# Patient Record
Sex: FEMALE | Race: WHITE | NOT HISPANIC OR LATINO | Employment: FULL TIME | URBAN - METROPOLITAN AREA
[De-identification: names, ages, dates, MRNs, and addresses within clinical notes are randomized per-mention and may not be internally consistent; named-entity substitution may affect disease eponyms.]

---

## 2018-02-01 ENCOUNTER — OFFICE VISIT (OUTPATIENT)
Dept: OBSTETRICS AND GYNECOLOGY | Facility: CLINIC | Age: 23
End: 2018-02-01
Payer: MEDICAID

## 2018-02-01 VITALS
SYSTOLIC BLOOD PRESSURE: 102 MMHG | DIASTOLIC BLOOD PRESSURE: 60 MMHG | WEIGHT: 127.88 LBS | BODY MASS INDEX: 25.83 KG/M2

## 2018-02-01 DIAGNOSIS — Z34.82 PRENATAL CARE, SUBSEQUENT PREGNANCY IN SECOND TRIMESTER: Primary | ICD-10-CM

## 2018-02-01 PROCEDURE — 99213 OFFICE O/P EST LOW 20 MIN: CPT | Mod: S$PBB,TH,, | Performed by: OBSTETRICS & GYNECOLOGY

## 2018-02-01 PROCEDURE — 99999 PR PBB SHADOW E&M-EST. PATIENT-LVL III: CPT | Mod: PBBFAC,,, | Performed by: OBSTETRICS & GYNECOLOGY

## 2018-02-01 PROCEDURE — 3008F BODY MASS INDEX DOCD: CPT | Mod: ,,, | Performed by: OBSTETRICS & GYNECOLOGY

## 2018-02-01 PROCEDURE — 99213 OFFICE O/P EST LOW 20 MIN: CPT | Mod: PBBFAC,TH,PO | Performed by: OBSTETRICS & GYNECOLOGY

## 2018-02-01 NOTE — PROGRESS NOTES
Patient presents today with amenorrhea. An office UPT confirms pregnancy. Initial OB ultrasound is scheduled for confirmation of viability and dates.  Laboratory studies have been ordered.   The general plan for obstetrical visits, diagnostics, medication use and avoidance, physician on-call arrangements, and appropriate lifestyle adjustments were discussed.  Patient history reviewed and all questions and concerns were addressed.    Patient was seen at [facility] for this pregnancy.  She had an ultrasound and was given a due date of 2018.  This gives her an EGA of 22-1/2 weeks today.  She is a  with previous vaginal delivery.  No problems so far during this pregnancy.  Lab work was also done at [facility].  Record request for labs and ultrasound will be submitted.  If not received by 28 weeks, we'll repeat with GDM etc.  Anatomical screen scheduled.  Fetal heart tones 140 with positive fetal movement on exam today.  Fundal height slightly above umbilicus, compatible with dates.  Menstrual dates but usable as patient was discontinuing Depo-Provera when conception occurred.  Return in 4 weeks.

## 2018-02-07 ENCOUNTER — PROCEDURE VISIT (OUTPATIENT)
Dept: OBSTETRICS AND GYNECOLOGY | Facility: CLINIC | Age: 23
End: 2018-02-07
Payer: MEDICAID

## 2018-02-07 DIAGNOSIS — Z36.3 ANTENATAL SCREENING FOR MALFORMATION USING ULTRASONICS: Primary | ICD-10-CM

## 2018-02-07 DIAGNOSIS — O09.30 INSUFFICIENT PRENATAL CARE, UNSPECIFIED TRIMESTER: ICD-10-CM

## 2018-02-07 DIAGNOSIS — Z34.82 PRENATAL CARE, SUBSEQUENT PREGNANCY IN SECOND TRIMESTER: ICD-10-CM

## 2018-02-07 PROCEDURE — 76805 OB US >/= 14 WKS SNGL FETUS: CPT | Mod: PBBFAC,PO

## 2018-02-07 PROCEDURE — 76805 OB US >/= 14 WKS SNGL FETUS: CPT | Mod: 26,S$PBB,, | Performed by: OBSTETRICS & GYNECOLOGY

## 2018-03-01 ENCOUNTER — ROUTINE PRENATAL (OUTPATIENT)
Dept: OBSTETRICS AND GYNECOLOGY | Facility: CLINIC | Age: 23
End: 2018-03-01
Payer: MEDICAID

## 2018-03-01 VITALS
WEIGHT: 132.25 LBS | SYSTOLIC BLOOD PRESSURE: 108 MMHG | DIASTOLIC BLOOD PRESSURE: 60 MMHG | BODY MASS INDEX: 26.72 KG/M2

## 2018-03-01 DIAGNOSIS — Z34.82 PRENATAL CARE, SUBSEQUENT PREGNANCY IN SECOND TRIMESTER: ICD-10-CM

## 2018-03-01 PROCEDURE — 99213 OFFICE O/P EST LOW 20 MIN: CPT | Mod: S$PBB,TH,, | Performed by: OBSTETRICS & GYNECOLOGY

## 2018-03-01 PROCEDURE — 99213 OFFICE O/P EST LOW 20 MIN: CPT | Mod: PBBFAC,TH,PO | Performed by: OBSTETRICS & GYNECOLOGY

## 2018-03-01 PROCEDURE — 99999 PR PBB SHADOW E&M-EST. PATIENT-LVL III: CPT | Mod: PBBFAC,,, | Performed by: OBSTETRICS & GYNECOLOGY

## 2018-03-01 NOTE — PROGRESS NOTES
Trace of leukocytes   Tail bone hurts when she gets up from sitting position pain level is about a 7

## 2018-03-01 NOTE — PROGRESS NOTES
Doing well; no problems  Counseled for symptomatic measures for sore coccyx  RTO 3 weeks with all labs (did not receive bloodwork results from [facility])

## 2018-03-26 ENCOUNTER — ROUTINE PRENATAL (OUTPATIENT)
Dept: OBSTETRICS AND GYNECOLOGY | Facility: CLINIC | Age: 23
End: 2018-03-26
Payer: MEDICAID

## 2018-03-26 ENCOUNTER — LAB VISIT (OUTPATIENT)
Dept: LAB | Facility: HOSPITAL | Age: 23
End: 2018-03-26
Attending: OBSTETRICS & GYNECOLOGY
Payer: MEDICAID

## 2018-03-26 ENCOUNTER — TELEPHONE (OUTPATIENT)
Dept: OBSTETRICS AND GYNECOLOGY | Facility: CLINIC | Age: 23
End: 2018-03-26

## 2018-03-26 VITALS
WEIGHT: 138.25 LBS | SYSTOLIC BLOOD PRESSURE: 102 MMHG | BODY MASS INDEX: 27.92 KG/M2 | DIASTOLIC BLOOD PRESSURE: 58 MMHG

## 2018-03-26 DIAGNOSIS — Z34.82 PRENATAL CARE, SUBSEQUENT PREGNANCY IN SECOND TRIMESTER: ICD-10-CM

## 2018-03-26 DIAGNOSIS — Z34.83 PRENATAL CARE, SUBSEQUENT PREGNANCY IN THIRD TRIMESTER: Primary | ICD-10-CM

## 2018-03-26 LAB
ABO + RH BLD: NORMAL
BASOPHILS # BLD AUTO: 0.02 K/UL
BASOPHILS NFR BLD: 0.2 %
BLD GP AB SCN CELLS X3 SERPL QL: NORMAL
DIFFERENTIAL METHOD: ABNORMAL
EOSINOPHIL # BLD AUTO: 0.2 K/UL
EOSINOPHIL NFR BLD: 1.7 %
ERYTHROCYTE [DISTWIDTH] IN BLOOD BY AUTOMATED COUNT: 12.9 %
GLUCOSE SERPL-MCNC: 104 MG/DL
HCT VFR BLD AUTO: 35.8 %
HGB BLD-MCNC: 11.8 G/DL
LYMPHOCYTES # BLD AUTO: 2.3 K/UL
LYMPHOCYTES NFR BLD: 22.1 %
MCH RBC QN AUTO: 30.7 PG
MCHC RBC AUTO-ENTMCNC: 33 G/DL
MCV RBC AUTO: 93 FL
MONOCYTES # BLD AUTO: 0.5 K/UL
MONOCYTES NFR BLD: 5 %
NEUTROPHILS # BLD AUTO: 7.3 K/UL
NEUTROPHILS NFR BLD: 70.5 %
PLATELET # BLD AUTO: 317 K/UL
PMV BLD AUTO: 10.2 FL
RBC # BLD AUTO: 3.84 M/UL
WBC # BLD AUTO: 10.4 K/UL

## 2018-03-26 PROCEDURE — 81220 CFTR GENE COM VARIANTS: CPT

## 2018-03-26 PROCEDURE — 99999 PR PBB SHADOW E&M-EST. PATIENT-LVL III: CPT | Mod: PBBFAC,,, | Performed by: OBSTETRICS & GYNECOLOGY

## 2018-03-26 PROCEDURE — 99213 OFFICE O/P EST LOW 20 MIN: CPT | Mod: S$PBB,TH,, | Performed by: OBSTETRICS & GYNECOLOGY

## 2018-03-26 PROCEDURE — 99213 OFFICE O/P EST LOW 20 MIN: CPT | Mod: PBBFAC,TH,PO | Performed by: OBSTETRICS & GYNECOLOGY

## 2018-03-26 PROCEDURE — 86703 HIV-1/HIV-2 1 RESULT ANTBDY: CPT

## 2018-03-26 PROCEDURE — 86762 RUBELLA ANTIBODY: CPT

## 2018-03-26 PROCEDURE — 87340 HEPATITIS B SURFACE AG IA: CPT

## 2018-03-26 PROCEDURE — 86592 SYPHILIS TEST NON-TREP QUAL: CPT

## 2018-03-26 PROCEDURE — 85025 COMPLETE CBC W/AUTO DIFF WBC: CPT

## 2018-03-26 PROCEDURE — 36415 COLL VENOUS BLD VENIPUNCTURE: CPT

## 2018-03-26 PROCEDURE — 82950 GLUCOSE TEST: CPT

## 2018-03-26 PROCEDURE — 86850 RBC ANTIBODY SCREEN: CPT

## 2018-03-26 NOTE — TELEPHONE ENCOUNTER
----- Message from Tete Golden sent at 3/26/2018  9:32 AM CDT -----  Contact: [phone]  Patient is downstairs having her glucose test. Her appt was for 9:30 but they can't draw her blood until 9:58. Please advise.

## 2018-03-26 NOTE — TELEPHONE ENCOUNTER
Patient notified that we are running a little behind. Informed her to go ahead and finish blood work then come up for routine ob appt. Patient verbalized understanding.

## 2018-03-27 LAB
HBV SURFACE AG SERPL QL IA: NEGATIVE
HIV 1+2 AB+HIV1 P24 AG SERPL QL IA: NEGATIVE
RPR SER QL: NORMAL
RUBV IGG SER-ACNC: 17 IU/ML
RUBV IGG SER-IMP: REACTIVE

## 2018-03-28 LAB — CFTR MUT ANL BLD/T: NORMAL

## 2018-04-05 ENCOUNTER — HOSPITAL ENCOUNTER (OUTPATIENT)
Facility: HOSPITAL | Age: 23
Discharge: HOME | End: 2018-04-06
Attending: OBSTETRICS & GYNECOLOGY | Admitting: OBSTETRICS & GYNECOLOGY
Payer: MEDICAID

## 2018-04-05 DIAGNOSIS — Z3A.31 31 WEEKS GESTATION OF PREGNANCY: ICD-10-CM

## 2018-04-05 PROCEDURE — 82731 ASSAY OF FETAL FIBRONECTIN: CPT

## 2018-04-05 PROCEDURE — G0378 HOSPITAL OBSERVATION PER HR: HCPCS

## 2018-04-05 RX ORDER — ACETAMINOPHEN 325 MG/1
650 TABLET ORAL EVERY 6 HOURS PRN
Status: DISCONTINUED | OUTPATIENT
Start: 2018-04-05 | End: 2018-04-06 | Stop reason: HOSPADM

## 2018-04-05 RX ORDER — SODIUM CHLORIDE, SODIUM LACTATE, POTASSIUM CHLORIDE, CALCIUM CHLORIDE 600; 310; 30; 20 MG/100ML; MG/100ML; MG/100ML; MG/100ML
INJECTION, SOLUTION INTRAVENOUS
Status: DISCONTINUED | OUTPATIENT
Start: 2018-04-05 | End: 2018-04-06 | Stop reason: HOSPADM

## 2018-04-05 RX ORDER — ONDANSETRON 8 MG/1
8 TABLET, ORALLY DISINTEGRATING ORAL EVERY 8 HOURS PRN
Status: DISCONTINUED | OUTPATIENT
Start: 2018-04-05 | End: 2018-04-06 | Stop reason: HOSPADM

## 2018-04-06 VITALS
RESPIRATION RATE: 16 BRPM | TEMPERATURE: 99 F | BODY MASS INDEX: 28.83 KG/M2 | HEART RATE: 76 BPM | WEIGHT: 143 LBS | HEIGHT: 59 IN | OXYGEN SATURATION: 98 %

## 2018-04-06 LAB — FIBRONECTIN FETAL SPEC QL: NEGATIVE

## 2018-04-06 PROCEDURE — 99211 OFF/OP EST MAY X REQ PHY/QHP: CPT | Mod: 25

## 2018-04-06 NOTE — DISCHARGE INSTRUCTIONS
Home Undelivered Discharge Instructions    After Discharge Orders:    Future Appointments  Date Time Provider Department Center   4/9/2018 9:30 AM Norbert Solis MD [facility]     Follow up with Dr. Solis on Monday       There are no discharge medications for this patient.              · Diet:  normal diet as tolerated    · Rest: normal activity as tolerated    Other instructions: Do kick counts once a day on your baby. Choose the time of day your baby is most active. Get in a comfortable lying or sitting position and time how long it takes to feel 10 kicks, twists, turns, swishes, or rolls. Call your physician or midwife if there have not been 10 kicks in 1.5 hours    Call physician or midwife, return to Labor and Delivery, call 911, or go to the nearest Emergency Room if: increased leakage or fluid, contractions more than  5 per  1 hour, decreased fetal movement, persistent low back pain or cramping, bleeding from vaginal area, difficulty urinating, pain with urination, difficulty breathing, new calf pain, persistent headache or vision change

## 2018-04-06 NOTE — NURSING
0745 - Dr. Choudhury at bedside for spec exam; plan to rpt u/s and if fluid ok, d/c home & f/u w/ Dr. Solis Monday (pt already has appt)

## 2018-04-06 NOTE — NURSING
() - Arrived on the unit from the E/D, 23 y/o  @ 31.2 weeks, +prenatal care with Dr. EMMETT Solis, with complaints of possible PROM.  Patient placed in triage #1, will be placed on EFM and [place], nursing assessment initiated.  See flow sheets for complete and continuing assessments.  () - Fetal fibronectin collected, Nitrazine negative, sterile vaginal exam performed:  /2.  () - Dr. Choudhury paged.  () - Spoke with Dr. Choudhury, update on patients nursing assessment relayed to him, orders given - stated to keep patient overnight and she will be re-evaluated in the morning - read back and verified.  () - Ultrasound tech at bedside.  () - Ultrasound procedure completed, patient transferred to room 357.  () - Dr. Choudhury called to check on status of patient, no new orders given.  (0012) - VSS.  Resting quietly with eyes closed, NAD noted.  (1360) - VSS. Resting quietly with eyes closed, NAD noted.  (1531) - Report given to Melyssa Pederson RN

## 2018-04-09 ENCOUNTER — ROUTINE PRENATAL (OUTPATIENT)
Dept: OBSTETRICS AND GYNECOLOGY | Facility: CLINIC | Age: 23
End: 2018-04-09
Payer: MEDICAID

## 2018-04-09 VITALS — BODY MASS INDEX: 29.03 KG/M2 | DIASTOLIC BLOOD PRESSURE: 60 MMHG | WEIGHT: 143.75 LBS | SYSTOLIC BLOOD PRESSURE: 98 MMHG

## 2018-04-09 DIAGNOSIS — Z34.83 PRENATAL CARE, SUBSEQUENT PREGNANCY IN THIRD TRIMESTER: Primary | ICD-10-CM

## 2018-04-09 PROCEDURE — 99999 PR PBB SHADOW E&M-EST. PATIENT-LVL III: CPT | Mod: PBBFAC,,, | Performed by: OBSTETRICS & GYNECOLOGY

## 2018-04-09 PROCEDURE — 99213 OFFICE O/P EST LOW 20 MIN: CPT | Mod: S$PBB,TH,, | Performed by: OBSTETRICS & GYNECOLOGY

## 2018-04-09 PROCEDURE — 99213 OFFICE O/P EST LOW 20 MIN: CPT | Mod: PBBFAC,TH,PO | Performed by: OBSTETRICS & GYNECOLOGY

## 2018-04-23 ENCOUNTER — ROUTINE PRENATAL (OUTPATIENT)
Dept: OBSTETRICS AND GYNECOLOGY | Facility: CLINIC | Age: 23
End: 2018-04-23
Payer: MEDICAID

## 2018-04-23 VITALS
BODY MASS INDEX: 29.17 KG/M2 | SYSTOLIC BLOOD PRESSURE: 105 MMHG | DIASTOLIC BLOOD PRESSURE: 61 MMHG | WEIGHT: 144.38 LBS

## 2018-04-23 DIAGNOSIS — Z34.83 PRENATAL CARE, SUBSEQUENT PREGNANCY IN THIRD TRIMESTER: Primary | ICD-10-CM

## 2018-04-23 PROCEDURE — 99213 OFFICE O/P EST LOW 20 MIN: CPT | Mod: PBBFAC,TH,PO | Performed by: OBSTETRICS & GYNECOLOGY

## 2018-04-23 PROCEDURE — 99999 PR PBB SHADOW E&M-EST. PATIENT-LVL III: CPT | Mod: PBBFAC,,, | Performed by: OBSTETRICS & GYNECOLOGY

## 2018-04-23 PROCEDURE — 99213 OFFICE O/P EST LOW 20 MIN: CPT | Mod: S$PBB,TH,, | Performed by: OBSTETRICS & GYNECOLOGY

## 2018-04-23 NOTE — PROGRESS NOTES
Had 1st baby by induction at 34 weeks for preeclampsia. Doing well now with no signs of preE.  RTO 2 weeks; precautions discussed.

## 2018-05-07 ENCOUNTER — ROUTINE PRENATAL (OUTPATIENT)
Dept: OBSTETRICS AND GYNECOLOGY | Facility: CLINIC | Age: 23
End: 2018-05-07
Payer: MEDICAID

## 2018-05-07 VITALS
SYSTOLIC BLOOD PRESSURE: 100 MMHG | WEIGHT: 148.38 LBS | BODY MASS INDEX: 29.97 KG/M2 | DIASTOLIC BLOOD PRESSURE: 76 MMHG

## 2018-05-07 DIAGNOSIS — Z3A.36 36 WEEKS GESTATION OF PREGNANCY: Primary | ICD-10-CM

## 2018-05-07 PROCEDURE — 87491 CHLMYD TRACH DNA AMP PROBE: CPT

## 2018-05-07 PROCEDURE — 99213 OFFICE O/P EST LOW 20 MIN: CPT | Mod: PBBFAC,PO | Performed by: OBSTETRICS & GYNECOLOGY

## 2018-05-07 PROCEDURE — 99213 OFFICE O/P EST LOW 20 MIN: CPT | Mod: S$PBB,TH,, | Performed by: OBSTETRICS & GYNECOLOGY

## 2018-05-07 PROCEDURE — 87081 CULTURE SCREEN ONLY: CPT

## 2018-05-07 PROCEDURE — 99999 PR PBB SHADOW E&M-EST. PATIENT-LVL III: CPT | Mod: PBBFAC,,, | Performed by: OBSTETRICS & GYNECOLOGY

## 2018-05-08 LAB
C TRACH DNA SPEC QL NAA+PROBE: NOT DETECTED
N GONORRHOEA DNA SPEC QL NAA+PROBE: NOT DETECTED

## 2018-05-10 LAB — BACTERIA SPEC AEROBE CULT: NORMAL

## 2018-05-14 ENCOUNTER — ROUTINE PRENATAL (OUTPATIENT)
Dept: OBSTETRICS AND GYNECOLOGY | Facility: CLINIC | Age: 23
End: 2018-05-14
Payer: MEDICAID

## 2018-05-14 VITALS
SYSTOLIC BLOOD PRESSURE: 112 MMHG | DIASTOLIC BLOOD PRESSURE: 60 MMHG | BODY MASS INDEX: 30.06 KG/M2 | WEIGHT: 148.81 LBS

## 2018-05-14 DIAGNOSIS — Z34.83 PRENATAL CARE, SUBSEQUENT PREGNANCY IN THIRD TRIMESTER: Primary | ICD-10-CM

## 2018-05-14 PROCEDURE — 99999 PR PBB SHADOW E&M-EST. PATIENT-LVL III: CPT | Mod: PBBFAC,,, | Performed by: OBSTETRICS & GYNECOLOGY

## 2018-05-14 PROCEDURE — 99213 OFFICE O/P EST LOW 20 MIN: CPT | Mod: PBBFAC,TH,PO | Performed by: OBSTETRICS & GYNECOLOGY

## 2018-05-14 PROCEDURE — 99213 OFFICE O/P EST LOW 20 MIN: CPT | Mod: S$PBB,TH,, | Performed by: OBSTETRICS & GYNECOLOGY

## 2018-05-17 ENCOUNTER — HOSPITAL ENCOUNTER (OUTPATIENT)
Facility: HOSPITAL | Age: 23
Discharge: HOME | End: 2018-05-17
Attending: OBSTETRICS & GYNECOLOGY | Admitting: OBSTETRICS & GYNECOLOGY
Payer: MEDICAID

## 2018-05-17 VITALS — HEIGHT: 59 IN | BODY MASS INDEX: 29.84 KG/M2 | WEIGHT: 148 LBS

## 2018-05-17 DIAGNOSIS — O47.00 PRETERM CONTRACTIONS: ICD-10-CM

## 2018-05-17 PROCEDURE — 99211 OFF/OP EST MAY X REQ PHY/QHP: CPT

## 2018-05-17 RX ORDER — ACETAMINOPHEN 500 MG
500 TABLET ORAL EVERY 6 HOURS PRN
Status: DISCONTINUED | OUTPATIENT
Start: 2018-05-17 | End: 2018-05-18 | Stop reason: HOSPADM

## 2018-05-17 RX ORDER — ONDANSETRON 8 MG/1
8 TABLET, ORALLY DISINTEGRATING ORAL EVERY 8 HOURS PRN
Status: DISCONTINUED | OUTPATIENT
Start: 2018-05-17 | End: 2018-05-18 | Stop reason: HOSPADM

## 2018-05-18 NOTE — PLAN OF CARE
222- pt arrived to unit with complaints of contractions about 3 hours ago and are about 5-10 min apart. Pt is a  at 37.2 weeks. Initial assessment completed at this time. Denies and bleeding or water leaking. Denies any pain at this time but does have some vaginal pressure/ SVE no change from Dr check on . 2cm/50%/-2. Explained to pt that she will be monitored for about 20 mins and call md on call for orders.   - discharge papers given to pt and discussed signed of labor pt stated she understands. Also explained other signs to look for for indication to come to ER for further evaluations. Pt and her mother both understood.

## 2018-05-21 ENCOUNTER — ROUTINE PRENATAL (OUTPATIENT)
Dept: OBSTETRICS AND GYNECOLOGY | Facility: CLINIC | Age: 23
End: 2018-05-21
Payer: MEDICAID

## 2018-05-21 VITALS
BODY MASS INDEX: 30.23 KG/M2 | SYSTOLIC BLOOD PRESSURE: 120 MMHG | DIASTOLIC BLOOD PRESSURE: 66 MMHG | WEIGHT: 149.69 LBS

## 2018-05-21 DIAGNOSIS — Z34.83 PRENATAL CARE, SUBSEQUENT PREGNANCY IN THIRD TRIMESTER: Primary | ICD-10-CM

## 2018-05-21 PROCEDURE — 99213 OFFICE O/P EST LOW 20 MIN: CPT | Mod: S$PBB,TH,, | Performed by: OBSTETRICS & GYNECOLOGY

## 2018-05-21 PROCEDURE — 99213 OFFICE O/P EST LOW 20 MIN: CPT | Mod: PBBFAC,TH,PO | Performed by: OBSTETRICS & GYNECOLOGY

## 2018-05-21 PROCEDURE — 99999 PR PBB SHADOW E&M-EST. PATIENT-LVL III: CPT | Mod: PBBFAC,,, | Performed by: OBSTETRICS & GYNECOLOGY

## 2018-05-25 ENCOUNTER — TELEPHONE (OUTPATIENT)
Dept: OBSTETRICS AND GYNECOLOGY | Facility: CLINIC | Age: 23
End: 2018-05-25

## 2018-05-25 NOTE — TELEPHONE ENCOUNTER
----- Message from Janna Aldana sent at 5/25/2018 10:20 AM CDT -----  No. [phone]    Patient needs an appointment to sign papers.  She is being induced on Wednesday, 5/30/18.

## 2018-05-30 ENCOUNTER — ANESTHESIA (OUTPATIENT)
Dept: OBSTETRICS AND GYNECOLOGY | Facility: HOSPITAL | Age: 23
End: 2018-05-30
Payer: MEDICAID

## 2018-05-30 ENCOUNTER — ANESTHESIA EVENT (OUTPATIENT)
Dept: OBSTETRICS AND GYNECOLOGY | Facility: HOSPITAL | Age: 23
End: 2018-05-30
Payer: MEDICAID

## 2018-05-30 ENCOUNTER — HOSPITAL ENCOUNTER (INPATIENT)
Facility: HOSPITAL | Age: 23
LOS: 2 days | Discharge: HOME | End: 2018-06-01
Attending: OBSTETRICS & GYNECOLOGY | Admitting: OBSTETRICS & GYNECOLOGY
Payer: MEDICAID

## 2018-05-30 DIAGNOSIS — Z37.9 NORMAL LABOR: ICD-10-CM

## 2018-05-30 LAB
ABO + RH BLD: NORMAL
BASOPHILS # BLD AUTO: 0.01 K/UL
BASOPHILS NFR BLD: 0.1 %
BLD GP AB SCN CELLS X3 SERPL QL: NORMAL
DIFFERENTIAL METHOD: ABNORMAL
EOSINOPHIL # BLD AUTO: 0.1 K/UL
EOSINOPHIL NFR BLD: 0.7 %
ERYTHROCYTE [DISTWIDTH] IN BLOOD BY AUTOMATED COUNT: 13.8 %
HCT VFR BLD AUTO: 33.5 %
HGB BLD-MCNC: 10.7 G/DL
LYMPHOCYTES # BLD AUTO: 2.5 K/UL
LYMPHOCYTES NFR BLD: 27 %
MCH RBC QN AUTO: 28.3 PG
MCHC RBC AUTO-ENTMCNC: 31.9 G/DL
MCV RBC AUTO: 89 FL
MONOCYTES # BLD AUTO: 0.5 K/UL
MONOCYTES NFR BLD: 5.7 %
NEUTROPHILS # BLD AUTO: 6 K/UL
NEUTROPHILS NFR BLD: 66.2 %
PLATELET # BLD AUTO: 300 K/UL
PMV BLD AUTO: 10.6 FL
RBC # BLD AUTO: 3.78 M/UL
WBC # BLD AUTO: 9.07 K/UL

## 2018-05-30 PROCEDURE — 86901 BLOOD TYPING SEROLOGIC RH(D): CPT

## 2018-05-30 PROCEDURE — 85025 COMPLETE CBC W/AUTO DIFF WBC: CPT

## 2018-05-30 PROCEDURE — 63600175 PHARM REV CODE 636 W HCPCS: Performed by: OBSTETRICS & GYNECOLOGY

## 2018-05-30 PROCEDURE — 25000003 PHARM REV CODE 250: Performed by: OBSTETRICS & GYNECOLOGY

## 2018-05-30 PROCEDURE — 51702 INSERT TEMP BLADDER CATH: CPT

## 2018-05-30 PROCEDURE — 25000003 PHARM REV CODE 250: Performed by: ANESTHESIOLOGY

## 2018-05-30 PROCEDURE — 62326 NJX INTERLAMINAR LMBR/SAC: CPT | Performed by: ANESTHESIOLOGY

## 2018-05-30 PROCEDURE — 27200710 HC EPIDURAL INFUSION PUMP SET: Performed by: ANESTHESIOLOGY

## 2018-05-30 PROCEDURE — 11000001 HC ACUTE MED/SURG PRIVATE ROOM

## 2018-05-30 PROCEDURE — 72100002 HC LABOR CARE, 1ST 8 HOURS

## 2018-05-30 PROCEDURE — 86592 SYPHILIS TEST NON-TREP QUAL: CPT

## 2018-05-30 PROCEDURE — 59409 OBSTETRICAL CARE: CPT | Mod: GB,,, | Performed by: OBSTETRICS & GYNECOLOGY

## 2018-05-30 PROCEDURE — 99211 OFF/OP EST MAY X REQ PHY/QHP: CPT

## 2018-05-30 PROCEDURE — 72200005 HC VAGINAL DELIVERY LEVEL II

## 2018-05-30 RX ORDER — OXYTOCIN/RINGER'S LACTATE 20/1000 ML
41.65 PLASTIC BAG, INJECTION (ML) INTRAVENOUS CONTINUOUS
Status: ACTIVE | OUTPATIENT
Start: 2018-05-30 | End: 2018-05-30

## 2018-05-30 RX ORDER — ONDANSETRON 8 MG/1
8 TABLET, ORALLY DISINTEGRATING ORAL EVERY 8 HOURS PRN
Status: DISCONTINUED | OUTPATIENT
Start: 2018-05-30 | End: 2018-06-01 | Stop reason: HOSPADM

## 2018-05-30 RX ORDER — ACETAMINOPHEN 325 MG/1
650 TABLET ORAL EVERY 6 HOURS PRN
Status: DISCONTINUED | OUTPATIENT
Start: 2018-05-30 | End: 2018-06-01 | Stop reason: HOSPADM

## 2018-05-30 RX ORDER — OXYCODONE AND ACETAMINOPHEN 10; 325 MG/1; MG/1
1 TABLET ORAL EVERY 4 HOURS PRN
Status: DISCONTINUED | OUTPATIENT
Start: 2018-05-30 | End: 2018-06-01 | Stop reason: HOSPADM

## 2018-05-30 RX ORDER — SODIUM CITRATE AND CITRIC ACID MONOHYDRATE 334; 500 MG/5ML; MG/5ML
30 SOLUTION ORAL ONCE AS NEEDED
Status: DISPENSED | OUTPATIENT
Start: 2018-05-30 | End: 2018-05-30

## 2018-05-30 RX ORDER — DIPHENHYDRAMINE HCL 25 MG
25 CAPSULE ORAL EVERY 4 HOURS PRN
Status: DISCONTINUED | OUTPATIENT
Start: 2018-05-30 | End: 2018-06-01 | Stop reason: HOSPADM

## 2018-05-30 RX ORDER — SODIUM CHLORIDE 9 MG/ML
INJECTION, SOLUTION INTRAVENOUS
Status: DISCONTINUED | OUTPATIENT
Start: 2018-05-30 | End: 2018-06-01 | Stop reason: HOSPADM

## 2018-05-30 RX ORDER — IBUPROFEN 600 MG/1
600 TABLET ORAL EVERY 6 HOURS
Status: DISCONTINUED | OUTPATIENT
Start: 2018-05-30 | End: 2018-06-01 | Stop reason: HOSPADM

## 2018-05-30 RX ORDER — DOCUSATE SODIUM 100 MG/1
200 CAPSULE, LIQUID FILLED ORAL 2 TIMES DAILY PRN
Status: DISCONTINUED | OUTPATIENT
Start: 2018-05-30 | End: 2018-06-01 | Stop reason: HOSPADM

## 2018-05-30 RX ORDER — FENTANYL CITRATE 50 UG/ML
INJECTION, SOLUTION INTRAMUSCULAR; INTRAVENOUS
Status: DISPENSED
Start: 2018-05-30 | End: 2018-05-30

## 2018-05-30 RX ORDER — SODIUM CHLORIDE, SODIUM LACTATE, POTASSIUM CHLORIDE, CALCIUM CHLORIDE 600; 310; 30; 20 MG/100ML; MG/100ML; MG/100ML; MG/100ML
INJECTION, SOLUTION INTRAVENOUS CONTINUOUS
Status: DISCONTINUED | OUTPATIENT
Start: 2018-05-30 | End: 2018-06-01 | Stop reason: HOSPADM

## 2018-05-30 RX ORDER — OXYTOCIN/RINGER'S LACTATE 20/1000 ML
2 PLASTIC BAG, INJECTION (ML) INTRAVENOUS CONTINUOUS
Status: DISCONTINUED | OUTPATIENT
Start: 2018-05-30 | End: 2018-06-01 | Stop reason: HOSPADM

## 2018-05-30 RX ORDER — DIPHENHYDRAMINE HYDROCHLORIDE 50 MG/ML
25 INJECTION INTRAMUSCULAR; INTRAVENOUS EVERY 4 HOURS PRN
Status: DISCONTINUED | OUTPATIENT
Start: 2018-05-30 | End: 2018-06-01 | Stop reason: HOSPADM

## 2018-05-30 RX ORDER — FAMOTIDINE 10 MG/ML
20 INJECTION INTRAVENOUS ONCE AS NEEDED
Status: ACTIVE | OUTPATIENT
Start: 2018-05-30 | End: 2018-05-30

## 2018-05-30 RX ORDER — ROPIVACAINE HYDROCHLORIDE 2 MG/ML
INJECTION, SOLUTION EPIDURAL; INFILTRATION; PERINEURAL
Status: DISPENSED
Start: 2018-05-30 | End: 2018-05-30

## 2018-05-30 RX ORDER — HYDROCODONE BITARTRATE AND ACETAMINOPHEN 5; 325 MG/1; MG/1
1 TABLET ORAL EVERY 4 HOURS PRN
Status: DISCONTINUED | OUTPATIENT
Start: 2018-05-30 | End: 2018-06-01 | Stop reason: HOSPADM

## 2018-05-30 RX ORDER — SODIUM CHLORIDE, SODIUM LACTATE, POTASSIUM CHLORIDE, CALCIUM CHLORIDE 600; 310; 30; 20 MG/100ML; MG/100ML; MG/100ML; MG/100ML
INJECTION, SOLUTION INTRAVENOUS CONTINUOUS
Status: DISCONTINUED | OUTPATIENT
Start: 2018-05-30 | End: 2018-05-30

## 2018-05-30 RX ORDER — MISOPROSTOL 200 UG/1
600 TABLET ORAL
Status: DISCONTINUED | OUTPATIENT
Start: 2018-05-30 | End: 2018-06-01 | Stop reason: HOSPADM

## 2018-05-30 RX ADMIN — IBUPROFEN 600 MG: 600 TABLET, FILM COATED ORAL at 05:05

## 2018-05-30 RX ADMIN — SODIUM CHLORIDE, SODIUM LACTATE, POTASSIUM CHLORIDE, AND CALCIUM CHLORIDE: .6; .31; .03; .02 INJECTION, SOLUTION INTRAVENOUS at 10:05

## 2018-05-30 RX ADMIN — Medication 2 MILLI-UNITS/MIN: at 11:05

## 2018-05-30 RX ADMIN — SODIUM CHLORIDE, SODIUM LACTATE, POTASSIUM CHLORIDE, AND CALCIUM CHLORIDE: .6; .31; .03; .02 INJECTION, SOLUTION INTRAVENOUS at 11:05

## 2018-05-30 RX ADMIN — Medication 12 ML/HR: at 11:05

## 2018-05-30 NOTE — LACTATION NOTE
This note was copied from a baby's chart.  Pt stable and in no distress.  Per moms request formula was given to mom.  Formula preparation and handout given to mom.  Questions answered.  Continue to monitor baby for feeds and assistance if needed for breastfeeding.

## 2018-05-30 NOTE — ANESTHESIA PROCEDURE NOTES
CSE    Patient location during procedure: OB  Start time: 5/30/2018 11:15 AM  Timeout: 5/30/2018 11:15 AM  End time: 5/30/2018 11:30 AM  Reason for block: at surgeon's request and post-op pain management  Staffing  Anesthesiologist: ABIOLA ROCKWELL  Resident/CRNA: ROSE MARY FARAH  Preanesthetic Checklist  Completed: patient identified, site marked, surgical consent, pre-op evaluation, timeout performed, IV checked, risks and benefits discussed and monitors and equipment checked  CSE  Patient position: sitting  Prep: ChloraPrep  Patient monitoring: heart rate, cardiac monitor, continuous pulse ox, continuous capnometry and frequent blood pressure checks  Approach: midline  Spinal Needle  Needle type: pencil-tip   Needle gauge: 22 G  Needle length: 5 in  Epidural Needle  Injection technique: ANETA saline  Needle type: Tuohy   Needle gauge: 18 G  Needle length: 3.5 in  Needle insertion depth: 6 cm  Location: L2-3  Needle localization: anatomical landmarks  Catheter  Catheter type: [employer]  Catheter size: 18 G  Catheter at skin depth: 13 cm  Test dose: lidocaine 1.5% with Epi 1-to-200,000  Additional Documentation: incremental injection, negative aspiration for heme and no paresthesia on injection  Assessment  Sensory level: T10   Dermatomal levels determined by alcohol swab  Intrathecal Medications:  Bolus administered: 2 mL of 0.2 ropivacaine  administered: primary anesthetic and 4 mcg of  fentanyl

## 2018-05-30 NOTE — PROGRESS NOTES
1320 - Epidural cath removed, tip in tact.  No redness, swelling erythema or bleeding at site.     1:41 PM Cessation of skin to skin per mom's request.  States she doesn't want to hold her any more.  Wants dad to hold her.

## 2018-05-30 NOTE — ANESTHESIA PREPROCEDURE EVALUATION
2018  Mikie Quigley is a 22 y.o., female. H/o preeclampsia. Here for CSE.    PRIOR ANES (in Epic)   none    ANES-RELATED MED/SURG  Patient Active Problem List   Diagnosis    Prenatal care, subsequent pregnancy in third trimester     contractions    Normal labor     History reviewed. No pertinent past medical history.  Past Surgical History:   Procedure Laterality Date    CHOLECYSTECTOMY         ALLERGIES  Review of patient's allergies indicates:  No Known Allergies          Anesthesia Evaluation    I have reviewed the Patient Summary Reports.    I have reviewed the Nursing Notes.      Review of Systems      Physical Exam  General:  Well nourished                 Recent Labs  Lab 18  0828   WBC 9.07   RBC 3.78*   HGB 10.7*   HCT 33.5*      MCV 89   MCH 28.3   MCHC 31.9*       Chemistry    No results found for: NA, K, CL, CO2, BUN, CREATININE, GLU No results found for: CALCIUM, ALKPHOS, AST, ALT, BILITOT, ESTGFRAFRICA, EGFRNONAA         Anesthesia Plan  Type of Anesthesia, risks & benefits discussed:  Anesthesia Type:  epidural  Patient's Preference:   Intra-op Monitoring Plan:   Intra-op Monitoring Plan Comments:   Post Op Pain Control Plan: epidural analgesia  Post Op Pain Control Plan Comments:   Induction:   IV  Beta Blocker:         Informed Consent: Patient understands risks and agrees with Anesthesia plan.  Questions answered. Anesthesia consent signed with patient.  ASA Score: 2     Day of Surgery Review of History & Physical:  There are no significant changes.          Ready For Surgery From Anesthesia Perspective.

## 2018-05-30 NOTE — PLAN OF CARE
0820: 22 year old  at 39 1/7 weeks was scheduled induction for received to Triage 1 in labor 4.5 cms, LDRs occupied at present. Contractions q 4-5 min, palpate mild-moderateDr. Will given report of assessment. , moderate variability, possible decel at 0758. Will monitor closely. Patient relaxed, breathing well with contraction.  1154: Patient admitted  into 361, Epidural infusing with complete pain relief, Pitocin augmentation begun. FHR reactive, contractions mild to moderate.Cervix 5.5, 90, 0 station.  1244: Cervix complete, +3, membranes noted to be leaking per Dr. Solis. Nsy called.

## 2018-05-30 NOTE — H&P
Chief complaint: Labor  History of present illness patient is 39 weeks pregnant and was scheduled for induction but labor started.  Past medical history: Patient is a  with previous vaginal delivery. No significant surgical or medical history.  Social history irrelevant family history noncontributory NO KNOWN DRUG ALLERGIES  Current medications prenatal vitamin  Review of systems: Normal for pregnancy at term  Vital signs normal  HEENT exam normal  Chest clear to auscultation percussion  Heart normal sinus rhythm  Extremities normal  Pelvic exam vertex presentation; 5 cm dilatation, uterus at 38 cm fundal height, good fetal heart tones.  Impression: Intrauterine pregnancy at 39 weeks in labor  Plan delivery of  infant.

## 2018-05-30 NOTE — LACTATION NOTE
05/30/18 [facility]   Maternal Medical Surgical History   History of Preexisting Medical Disorder no   Infertility History no   History of Behavioral Health Disorder no   Infant Information   Infant's Name Lisa   Infant's Medical Care Provider Efrain   Maternal Infant Assessment   Breast Size Issue none   Breast Shape Bilateral:;round   Breast Density Bilateral:;soft   Areola Bilateral:;elastic   Nipple(s) Bilateral:;graspable;everted  (breast moldable/nipples cele with stimulation)   Additional Documentation LATCH Score (Group)   Infant Assessment   Medical Condition none   Mouth Size average   Tongue/Frenulum Symptoms intact   Rooting Reflex present   Swallow Reflex present   Skin Color pink   LATCH Score   Latch 1-->repeated attempts, holds nipple in mouth, stimulate to suck   Audible Swallowing 2-->spontaneous and intermittent (24 hrs old)   Type Of Nipple 2-->everted (after stimulation)   Comfort (Breast/Nipple) 2-->soft/nontender   Hold (Positioning) 1-->minimal assist, teach one side: mother does other, staff holds   Score (less than 7 for 2/more consecutive times, consult Lactation Consultant) 8   Pain/Comfort Assessments   Pain Assessment Performed Yes       Number Scale   Presence of Pain complains of pain/discomfort   Location - Side Right   Location nipple(s)   Pain Rating: Activity 6  (readjusted latch. pt denies pain or need to relatch infant)   Factors that Relieve Pain repositioning   Maternal Infant Feeding   Maternal Preparation hand hygiene;breast care   Maternal Emotional State relaxed;assist needed   Infant Positioning cradle   Signs of Milk Transfer audible swallow;infant jaw motion present   Pain Location nipple, right   Comfort Measures Before/During Feeding latch adjusted;infant position adjusted   Time Spent (min) 15-30 min   Comfort Measures Following Feeding (discussed colostrum on nipple)   Latch Assistance yes   Breastfeeding Education adequate infant intake;importance of skin-to-skin  contact;increasing milk supply;medication effects;milk expression, hand;prenatal vitamins continued   Breastfeeding History   Currently Breastfeeding yes   Infant First Feeding   Skin-to-Skin Contact Initiated   Feeding Infant   Feeding Readiness Cues eager;energy for feeding;rooting;sucking motion present   Feeding Tolerance/Success strong suck;rooting;eager;coordinated suck;coordinated swallow   Effective Latch During Feeding yes   Suck/Swallow Coordination present   Skin-to-Skin Contact During Feeding yes   Lactation Referrals   Lactation Consult Breastfeeding assessment;Initial assessment;Knowledge deficit   Lactation Interventions   Attachment Promotion breastfeeding assistance provided;counseling provided;face-to-face positioning promoted;infant-mother separation minimized;role responsibility promoted;privacy provided;rooming-in promoted;skin-to-skin contact encouraged   Breastfeeding Assistance assisted with positioning;feeding cue recognition promoted;feeding on demand promoted;feeding session observed;infant latch-on verified;infant stimulated to wakeful state;infant suck/swallow verified;support offered   Maternal Breastfeeding Support diary/feeding log utilized;encouragement offered;infant-mother separation minimized;lactation counseling provided;maternal hydration promoted;maternal nutrition promoted;maternal rest encouraged   Latch Promotion positioning assisted;infant's mouth opened gently;infant moved to breast  (suck stimultaed with gloved finger then moved to breast)

## 2018-05-30 NOTE — PLAN OF CARE
Problem: Breastfeeding (Adult,Obstetrics,Pediatric)  Goal: Signs and Symptoms of Listed Potential Problems Will be Absent, Minimized or Managed (Breastfeeding)  Signs and symptoms of listed potential problems will be absent, minimized or managed by discharge/transition of care (reference Breastfeeding (Adult,Obstetrics,Pediatric) CPG).  Outcome: Ongoing (interventions implemented as appropriate)  Mother will breastfeed on cue at least 8 or more time sin 24 hours. Mother will monitor for adequate supply and monitor wet and dirty diapers. Mother will call for any breastfeeding needs.

## 2018-05-30 NOTE — L&D DELIVERY NOTE
[facility]  Vaginal Delivery   Operative Note    SUMMARY      Spontaneous vaginal delivery of live infant, female 9/9 apgars.  Infant delivered position OA .  Nuchal cord: .shoulder only  No episiotomy, no tears or lacerations.     Spontaneous delivery of Schultze placenta intact, and  pitocin given ..  .  Patient tolerated delivery well. Sponge needle and lap counted correctly x2.    Indications: labor   Pregnancy complicated by: no problems  Patient Active Problem List   Diagnosis    Prenatal care, subsequent pregnancy in third trimester     contractions    Normal labor     Admitting GA: 39w1d  Delivery:

## 2018-05-30 NOTE — PROGRESS NOTES
Patient presented for induction at 5 AM but a room was not available.  Patient was checked before being sent home and was noted to be 5 cm so she was placed in triage until in LDR could open up.  If one does not become available and patient may need to deliver in a  room.  Patient is 39 weeks 1 day.  .  No prenatal problems.

## 2018-05-31 LAB
BASOPHILS # BLD AUTO: 0.01 K/UL
BASOPHILS NFR BLD: 0.1 %
DIFFERENTIAL METHOD: ABNORMAL
EOSINOPHIL # BLD AUTO: 0.1 K/UL
EOSINOPHIL NFR BLD: 0.8 %
ERYTHROCYTE [DISTWIDTH] IN BLOOD BY AUTOMATED COUNT: 13.9 %
HCT VFR BLD AUTO: 33.8 %
HGB BLD-MCNC: 10.6 G/DL
LYMPHOCYTES # BLD AUTO: 2.7 K/UL
LYMPHOCYTES NFR BLD: 30.6 %
MCH RBC QN AUTO: 28 PG
MCHC RBC AUTO-ENTMCNC: 31.4 G/DL
MCV RBC AUTO: 89 FL
MONOCYTES # BLD AUTO: 0.5 K/UL
MONOCYTES NFR BLD: 5.5 %
NEUTROPHILS # BLD AUTO: 5.6 K/UL
NEUTROPHILS NFR BLD: 62.8 %
PLATELET # BLD AUTO: 283 K/UL
PMV BLD AUTO: 10.8 FL
RBC # BLD AUTO: 3.79 M/UL
RPR SER QL: NORMAL
WBC # BLD AUTO: 8.93 K/UL

## 2018-05-31 PROCEDURE — 11000001 HC ACUTE MED/SURG PRIVATE ROOM

## 2018-05-31 PROCEDURE — 36415 COLL VENOUS BLD VENIPUNCTURE: CPT

## 2018-05-31 PROCEDURE — 99231 SBSQ HOSP IP/OBS SF/LOW 25: CPT | Mod: ,,, | Performed by: OBSTETRICS & GYNECOLOGY

## 2018-05-31 PROCEDURE — 85025 COMPLETE CBC W/AUTO DIFF WBC: CPT

## 2018-05-31 PROCEDURE — 25000003 PHARM REV CODE 250: Performed by: OBSTETRICS & GYNECOLOGY

## 2018-05-31 RX ADMIN — IBUPROFEN 600 MG: 600 TABLET, FILM COATED ORAL at 11:05

## 2018-05-31 RX ADMIN — IBUPROFEN 600 MG: 600 TABLET, FILM COATED ORAL at 12:05

## 2018-05-31 RX ADMIN — OXYCODONE HYDROCHLORIDE AND ACETAMINOPHEN 1 TABLET: 10; 325 TABLET ORAL at 11:05

## 2018-05-31 RX ADMIN — IBUPROFEN 600 MG: 600 TABLET, FILM COATED ORAL at 06:05

## 2018-05-31 RX ADMIN — IBUPROFEN 600 MG: 600 TABLET, FILM COATED ORAL at 05:05

## 2018-05-31 NOTE — LACTATION NOTE
Mother is formula feeding her baby at this time.  She stated that she does not want to breastfeed anymore as she prefers to formula feed.  Benefits discussed and discussed NNE.  All questions answered, verbalizes understanding.  Mother told to call for assistance if she decides to start breastfeeding.

## 2018-05-31 NOTE — PROGRESS NOTES
Postpartum day #1:  Patient is doing very well. Afebrile; VS stable.  Uterus firm and below umbilicus, lochia moderate.  Increase activity and diet.  Declines breast feeding

## 2018-05-31 NOTE — PLAN OF CARE
Problem: Patient Care Overview  Goal: Plan of Care Review  Outcome: Ongoing (interventions implemented as appropriate)  Mom will continue to formula feed her baby 8+/24hrs with early feeding cues.  She will monitor baby for signs of an adequate feeding.  Will follow up with pediatrician as instructed.  She will call Lactation Center for further information or concerns.

## 2018-05-31 NOTE — PLAN OF CARE
Problem: Patient Care Overview  Goal: Plan of Care Review  Outcome: Ongoing (interventions implemented as appropriate)  POC reviewed with patient. Pt tolerating regular diet, ambulating independently, and voiding spontaneously. VS remain stable. Afebrile. Pain being well controlled with ordered medications. No acute distress noted. Will continue to monitor.

## 2018-06-01 VITALS
OXYGEN SATURATION: 97 % | SYSTOLIC BLOOD PRESSURE: 93 MMHG | DIASTOLIC BLOOD PRESSURE: 53 MMHG | HEART RATE: 67 BPM | WEIGHT: 150 LBS | HEIGHT: 59 IN | RESPIRATION RATE: 18 BRPM | TEMPERATURE: 98 F | BODY MASS INDEX: 30.24 KG/M2

## 2018-06-01 PROCEDURE — 63600175 PHARM REV CODE 636 W HCPCS: Performed by: OBSTETRICS & GYNECOLOGY

## 2018-06-01 PROCEDURE — 25000003 PHARM REV CODE 250: Performed by: OBSTETRICS & GYNECOLOGY

## 2018-06-01 PROCEDURE — 90471 IMMUNIZATION ADMIN: CPT | Performed by: OBSTETRICS & GYNECOLOGY

## 2018-06-01 PROCEDURE — 99238 HOSP IP/OBS DSCHRG MGMT 30/<: CPT | Mod: ,,, | Performed by: OBSTETRICS & GYNECOLOGY

## 2018-06-01 PROCEDURE — 90715 TDAP VACCINE 7 YRS/> IM: CPT | Performed by: OBSTETRICS & GYNECOLOGY

## 2018-06-01 RX ORDER — HYDROCODONE BITARTRATE AND ACETAMINOPHEN 5; 325 MG/1; MG/1
1 TABLET ORAL EVERY 6 HOURS PRN
Qty: 20 TABLET | Refills: 0 | Status: SHIPPED | OUTPATIENT
Start: 2018-06-01 | End: 2018-10-02

## 2018-06-01 RX ORDER — IBUPROFEN 600 MG/1
600 TABLET ORAL EVERY 6 HOURS PRN
Qty: 30 TABLET | Refills: 2 | Status: SHIPPED | OUTPATIENT
Start: 2018-06-01 | End: 2018-10-02

## 2018-06-01 RX ADMIN — IBUPROFEN 600 MG: 600 TABLET, FILM COATED ORAL at 05:06

## 2018-06-01 RX ADMIN — CLOSTRIDIUM TETANI TOXOID ANTIGEN (FORMALDEHYDE INACTIVATED), CORYNEBACTERIUM DIPHTHERIAE TOXOID ANTIGEN (FORMALDEHYDE INACTIVATED), BORDETELLA PERTUSSIS TOXOID ANTIGEN (GLUTARALDEHYDE INACTIVATED), BORDETELLA PERTUSSIS FILAMENTOUS HEMAGGLUTININ ANTIGEN (FORMALDEHYDE INACTIVATED), BORDETELLA PERTUSSIS PERTACTIN ANTIGEN, AND BORDETELLA PERTUSSIS FIMBRIAE 2/3 ANTIGEN 0.5 ML: 5; 2; 2.5; 5; 3; 5 INJECTION, SUSPENSION INTRAMUSCULAR at 10:06

## 2018-06-01 NOTE — PROGRESS NOTES
Discharge instructions given verbally and in writing. Verbalized understanding. Received Mother-Baby care guide during hospital stay. Prescriptions given with explanation for use. Verbalized understanding. CDC vaccine information statement given and risk/benifits of vaccine discussed. Tdap given per pt request. States she feels comfortable taking care of baby and has demonstrated ability to care for  and herself. Says she will have assistance when she returns home.

## 2018-06-01 NOTE — DISCHARGE SUMMARY
Admit Date: 2018  Discharge Date: 2018    Attending physician: MD Samantha    Diagnosis:  Term Pregnancy  Viable Female  Infant      Procedure:           Hospital Course: Afebrile and vital signs stable throughout hospital course. Routine progressive care. Vaginal bleeding stable. Tolerating oral intake. Positive flatus.    Discharged Condition: Improving    Disposition: Discharged to home or self care    Activity:  As tolerated  Pelvic rest x 6 weeks  Diet: Regular  Medications: Motrin and Percocet   Follow up:  3  weeks

## 2018-06-01 NOTE — DISCHARGE INSTRUCTIONS
"Patient Discharge Instructions for Postpartum Women    Resume Regular Diet  Increase activity gradually, no heavy lifting  Shower  No tampons, douching or sexual intercourse.  Discuss birth control options with your physician.  Wear a support bra  Return to work/school when you've been cleared by a physician    Call your physician if     *Fever of 100.4 or higher  *Persistent nausea/ vomiting  *Incisional drainage  *Heavy vaginal bleeding or large clots (Heavy bleeding is soaking 1 pad in an hour)  *Swelling and pain in arms or legs  *Severe headaches, blurred vision or fainting  *Shortness of breath  *Frequency and burning with urination  *Signs of postpartum depression, discuss these signs with your physician    Call lactation services for questions regarding feeding, nipple and breast care, and general questions about lactation.  They can be reached at [phone]         Understanding Postpartum Depression    You've just had a baby.  You know you should be excited and happy.  But instead you find yourself crying for no reason.  You may have trouble coping with your daily tasks.  You feel sad, tired, and hopeless most of the time.  You may even feel ashamed or guilty.  But what you're going through is not your fault and you can feel better.  Talk to your doctor.  He or she can help.    Depression After Childbirth    You may be weepy and tired right after giving birth.  These feelings are normal.  They're sometimes called the "baby blues."  These blues go away 2-3 weeks.  However, postpartum (meaning "after birth") depression lasts much longer and is more sever than the "baby blues."  It can make you feel sad and hopeless.  You may also fear that your baby will be harmed and worry about being a bad mother.      What is Depression?    Depression is a mood disorder that affects the way you think and feel.  The most common symptom is a feeling of deep sadness.  You may also feel as if you just can't cope with life.  "   Other symptoms include:      * Gaining or loosing weight  * Sleeping too much or too little  * Feeling tired all the time  * Feeling restless  * Fears of harming your baby   * Lack of interest in your baby  * Feeling worthless or guilty  * No longer finding pleasure in things you used to  * Having trouble thinking clearly or making decisions  * Thoughts of hurting yourself or your baby    What Causes Postpartum Depression    The exact causes of postpartum depression isn't known.  It may be due to changes in your hormones during and after childbirth.  You may also be tired from caring for your baby and adjusting to being a mother.  All these factors may make you feel depressed.  In some cases, your genes may also play a role.    Depression Can Be Treated    The good news is that there are many ways to treat postpartum depression.  Talking to your doctor is the first step toward feeling better.    Resources:    * National [place] of Mental Health  -- 662.792.4100    www.nimh.nih.gov    * National [place] on Mental Illness --521.260.2381    Www.agnes.org    * Mental Health [place] -- 750.247.8979     Www.[facility].org    * National Suicide Hotline --515.852.2284 (800-SUICIDE)    4977-9867 [employer]  All rights reserved.  This information is not intended as a substitute for professional medical care.  Always follow up with your healthcare professional's instructions.            Breastfeeding Discharge Instructions       Feed the baby at the earliest sign of hunger or comfort  o Hands to mouth, sucking motions  o Rooting or searching for something to suck on  o Dont wait for crying - it is a sign of distress     The feedings may be 8-12 times per 24hrs and will not follow a schedule   Avoid pacifiers and bottles for the first 4 weeks   Alternate the breast you start the feeding with, or start with the breast that feels the fullest   Switch breasts when the baby takes himself off the breast or falls  asleep   Keep offering breasts until the baby looks full, no longer gives hunger signs, and stays asleep when placed on his back in the crib   If the baby is sleepy and wont wake for a feeding, put the baby skin-to-skin dressed in a diaper against the mothers bare chest   Sleep near your baby   The baby should be positioned and latched on to the breast correctly  o Chest-to-chest, chin in the breast  o Babys lips are flipped outward  o Babys mouth is stretched open wide like a shout  o Babys sucking should feel like tugging to the mother  - The baby should be drinking at the breast:  o You should hear swallowing or gulping throughout the feeding  o You should see milk on the babys lips when he comes off the breast  o Your breasts should be softer when the baby is finished feeding  o The baby should look relaxed at the end of feedings  o After the 4th day and your milk is in:  o The babys poop should turn bright yellow and be loose, watery, and seedy  o The baby should have at least 3-4 poops the size of the palm of your hand per day  o The baby should have at least 5-6 wet diapers per day  o The urine should be light yellow in color  You should drink when you are thirsty and eat a healthy diet when you are    hungry.     Take naps to get the rest you need.   Take medications and/or drink alcohol only with permission of your obstetrician    or the babys pediatrician.  You can also call the [facility],   ([phone]), Monday-Friday, 8am-5pm Central time, to get the most   up-to-date evidence-based information on the use of medications during   pregnancy and breastfeeding.      The baby should be examined by a pediatrician at 3-5 days of age.  Once your   milk comes in, the baby should be gaining at least ½ - 1oz each day and should be back to birthweight no later than 10-14 days of age.          Community Resources    [facility] Breastfeeding Warmline: 102.786.5340  [facility]  clinics: provide incentives and breastpumps to eligible mothers  [employer] (LLLI):  mother-to-mother support group website        www.lll.org  [facility] [employer]eche [employer]alice mother-to-mother support groups:        www.[employer]lpidio[employer].RACTIV        [employer]he League [facility]   Dr. Gerald Rizo website for latch videos and general information:        www.breastfeedinginc.ca  [facility] is a call center that provides information about the safety of taking medications while breastfeeding.  Call 1-306.820.2477, M-F, 8am-5pm, [place].  International Lactation [employer]ant Association provides resources for assistance:        www.ilca.org  [facility] Breastfeeding Coalition provides informationand resources for parents  and the community    http://[facility] [place].org     Kat Bailey is a mom-to-mom support group:                             www.noyannickSimple.TV.RACTIV//breastfeedng-support/  [facility] [facility] Healthy Babies:  6-446-937-BABY(8051)  [employer]: a breastfeeding support group for women of color, 710.352.9696

## 2018-09-03 PROBLEM — Z37.9 NORMAL LABOR: Status: RESOLVED | Noted: 2018-05-30 | Resolved: 2018-09-03

## 2018-10-02 ENCOUNTER — OFFICE VISIT (OUTPATIENT)
Dept: OBSTETRICS AND GYNECOLOGY | Facility: CLINIC | Age: 23
End: 2018-10-02
Payer: MEDICAID

## 2018-10-02 VITALS
SYSTOLIC BLOOD PRESSURE: 106 MMHG | WEIGHT: 130.94 LBS | HEIGHT: 59 IN | BODY MASS INDEX: 26.4 KG/M2 | DIASTOLIC BLOOD PRESSURE: 70 MMHG

## 2018-10-02 DIAGNOSIS — Z30.013 ENCOUNTER FOR INITIAL PRESCRIPTION OF INJECTABLE CONTRACEPTIVE: Primary | ICD-10-CM

## 2018-10-02 PROBLEM — O47.00 PRETERM CONTRACTIONS: Status: RESOLVED | Noted: 2018-05-17 | Resolved: 2018-10-02

## 2018-10-02 PROBLEM — Z34.83 PRENATAL CARE, SUBSEQUENT PREGNANCY IN THIRD TRIMESTER: Status: RESOLVED | Noted: 2018-03-01 | Resolved: 2018-10-02

## 2018-10-02 LAB
B-HCG UR QL: NEGATIVE
CTP QC/QA: YES

## 2018-10-02 PROCEDURE — 99212 OFFICE O/P EST SF 10 MIN: CPT | Mod: S$PBB,,, | Performed by: NURSE PRACTITIONER

## 2018-10-02 PROCEDURE — 81025 URINE PREGNANCY TEST: CPT | Mod: PBBFAC | Performed by: NURSE PRACTITIONER

## 2018-10-02 PROCEDURE — 99213 OFFICE O/P EST LOW 20 MIN: CPT | Mod: PBBFAC | Performed by: NURSE PRACTITIONER

## 2018-10-02 PROCEDURE — 99999 PR PBB SHADOW E&M-EST. PATIENT-LVL III: CPT | Mod: PBBFAC,,, | Performed by: NURSE PRACTITIONER

## 2018-10-02 RX ORDER — MEDROXYPROGESTERONE ACETATE 150 MG/ML
150 INJECTION, SUSPENSION INTRAMUSCULAR
Qty: 1 ML | Refills: 3 | Status: SHIPPED | OUTPATIENT
Start: 2018-10-02 | End: 2019-09-24

## 2018-10-02 NOTE — PROGRESS NOTES
CC: Birth control    HPI: Pt is a 22 y.o.  female who presents for birth control. She is not currently on contraception but would like to get back on the depo shot. LMP 9/10/18. Denies tobacco use. No problems today. Requesting to get injection today.    ROS:  GENERAL: Feeling well overall. Denies fever or chills.   SKIN: Denies rash or lesions.   HEAD: Denies head injury or headache.   NODES: Denies enlarged lymph nodes.   CHEST: Denies chest pain or shortness of breath.   CARDIOVASCULAR: Denies palpitations or left sided chest pain.   ABDOMEN: No abdominal pain, constipation, diarrhea, nausea, vomiting or rectal bleeding.   URINARY: No dysuria, hematuria, or burning on urination.  REPRODUCTIVE: See HPI.   BREASTS: Denies pain, lumps, or nipple discharge.   HEMATOLOGIC: No easy bruisability or excessive bleeding.   MUSCULOSKELETAL: Denies joint pain or swelling.   NEUROLOGIC: Denies syncope or weakness.   PSYCHIATRIC: Denies depression, anxiety or mood swings.    PE:   APPEARANCE: Well nourished, well developed, White female in no acute distress.  PELVIC: deferred, talk only    Diagnosis:  1. Encounter for initial prescription of injectable contraceptive        Plan:     Orders Placed This Encounter    POCT urine pregnancy    medroxyPROGESTERone (DEPO-PROVERA) 150 mg/mL injection     UPT neg  -Depo Provera use and potential side effects including altered menstrual bleeding pattern, weight gain, increased fracture risk due to reversible osteoporosis;  -osteoporosis prevention, calcium supplementation, regular weight bearing exercise;  -STDs and prevention, including the HPV vaccine     Injection today in pharmacy    Follow-up prn

## 2018-10-03 NOTE — PROGRESS NOTES
Mikie Quigley received IM Depo Provera to the left upper outer side of the deltoid on 10/3/2018.    The patient was instructed to get the next injection on .    Lot Number: [id number]  Expiration Date: 12/2019  BY IKER QURESHI

## 2018-11-26 ENCOUNTER — LAB VISIT (OUTPATIENT)
Dept: LAB | Facility: OTHER | Age: 23
End: 2018-11-26
Payer: MEDICAID

## 2018-11-26 ENCOUNTER — OFFICE VISIT (OUTPATIENT)
Dept: OBSTETRICS AND GYNECOLOGY | Facility: CLINIC | Age: 23
End: 2018-11-26
Payer: MEDICAID

## 2018-11-26 VITALS
DIASTOLIC BLOOD PRESSURE: 62 MMHG | SYSTOLIC BLOOD PRESSURE: 100 MMHG | BODY MASS INDEX: 25.52 KG/M2 | WEIGHT: 126.56 LBS | HEIGHT: 59 IN

## 2018-11-26 DIAGNOSIS — Z11.3 SCREEN FOR STD (SEXUALLY TRANSMITTED DISEASE): ICD-10-CM

## 2018-11-26 DIAGNOSIS — Z12.4 PAP SMEAR FOR CERVICAL CANCER SCREENING: ICD-10-CM

## 2018-11-26 DIAGNOSIS — Z78.9 USES DEPO-PROVERA AS PRIMARY BIRTH CONTROL METHOD: ICD-10-CM

## 2018-11-26 DIAGNOSIS — Z01.419 VISIT FOR GYNECOLOGIC EXAMINATION: ICD-10-CM

## 2018-11-26 DIAGNOSIS — Z01.419 VISIT FOR GYNECOLOGIC EXAMINATION: Primary | ICD-10-CM

## 2018-11-26 PROCEDURE — 86703 HIV-1/HIV-2 1 RESULT ANTBDY: CPT

## 2018-11-26 PROCEDURE — 87491 CHLMYD TRACH DNA AMP PROBE: CPT

## 2018-11-26 PROCEDURE — 36415 COLL VENOUS BLD VENIPUNCTURE: CPT

## 2018-11-26 PROCEDURE — 99395 PREV VISIT EST AGE 18-39: CPT | Mod: S$PBB,,, | Performed by: NURSE PRACTITIONER

## 2018-11-26 PROCEDURE — 80074 ACUTE HEPATITIS PANEL: CPT

## 2018-11-26 PROCEDURE — 99999 PR PBB SHADOW E&M-EST. PATIENT-LVL III: CPT | Mod: PBBFAC,,, | Performed by: NURSE PRACTITIONER

## 2018-11-26 PROCEDURE — 86592 SYPHILIS TEST NON-TREP QUAL: CPT

## 2018-11-26 PROCEDURE — 99213 OFFICE O/P EST LOW 20 MIN: CPT | Mod: PBBFAC | Performed by: NURSE PRACTITIONER

## 2018-11-26 PROCEDURE — 88175 CYTOPATH C/V AUTO FLUID REDO: CPT

## 2018-11-26 NOTE — PROGRESS NOTES
CC: Annual  HPI: Pt is a 22 y.o.  female who presents for routine annual exam. She is a new patient to me. She uses depo provera for contraception. Last shot was 10/2/18. She does want STD screening. C/o new onset chest pain since starting DMPA about 6 months ago. Random in nature, always spontaneously resolves, none today. Denies SOB, hx of stroke, or blood clots. Does not smoke cigarettes. Two daughters at home, youngest is 6 months. She is not breast feeding. Delivered vaginally at [facility]. Denies family hx of breast cancer. She does not have a PCP.     Last pap, never had one before    ROS:  GENERAL: Feeling well overall. Denies fever or chills.   SKIN: Denies rash or lesions.   HEAD: Denies head injury or headache.   NODES: Denies enlarged lymph nodes.   CHEST: Denies chest pain or shortness of breath.   CARDIOVASCULAR: Denies palpitations or left sided chest pain.   ABDOMEN: No abdominal pain, constipation, diarrhea, nausea, vomiting or rectal bleeding.   URINARY: No dysuria, hematuria, or burning on urination.  REPRODUCTIVE: See HPI.   BREASTS: Denies pain, lumps, or nipple discharge.   HEMATOLOGIC: No easy bruisability or excessive bleeding.   MUSCULOSKELETAL: Denies joint pain or swelling.   NEUROLOGIC: Denies syncope or weakness.   PSYCHIATRIC: Denies depression, anxiety or mood swings.    PE:   APPEARANCE: Well nourished, well developed, White female in no acute distress.  NODES: no cervical, supraclavicular, or inguinal lymphadenopathy  BREASTS: Symmetrical, no skin changes or visible lesions. No palpable masses, nipple discharge or adenopathy bilaterally.  ABDOMEN: Soft. No tenderness or masses. No distention. No hernias palpated. No CVA tenderness.  VULVA: No lesions. Normal external female genitalia.  URETHRAL MEATUS: Normal size and location, no lesions, no prolapse.  URETHRA: No masses, tenderness, or prolapse.  VAGINA: Moist. No lesions or lacerations noted. No abnormal discharge present. No  odor present.   CERVIX: No lesions or discharge. No cervical motion tenderness.   UTERUS: Normal size, regular shape, mobile, non-tender.  ADNEXA: No tenderness. No fullness or masses palpated in the adnexal regions.   ANUS PERINEUM: Normal.      Diagnosis:  1. Visit for gynecologic examination    2. Uses Depo-Provera as primary birth control method    3. Pap smear for cervical cancer screening    4. Screen for STD (sexually transmitted disease)        Plan:     Orders Placed This Encounter    C. trachomatis/N. gonorrhoeae by AMP DNA    HIV-1 and HIV-2 antibodies    RPR    Hepatitis panel, acute    Liquid-based pap smear, screening     1. Pap done, turning 23 in 1 week  2. Continue with DMPA  -Depo Provera use and potential side effects including altered menstrual bleeding pattern, weight gain, increased fracture risk due to reversible osteoporosis;  -osteoporosis prevention, calcium supplementation, regular weight bearing exercise;  -STDs and prevention, including the HPV vaccine   3. Labs today  4. Rec'd to f/u with PCP for chest pain; names given    Patient was counseled today on the new ACS guidelines for cervical cytology screening as well as the current recommendations for breast cancer screening. She was counseled to follow up with her PCP for other routine health maintenance. Counseling session lasted approximately 10 minutes, and all her questions were answered.    Follow-up with me in 1 year for routine exam; pap in 3 years.

## 2018-11-27 LAB
HAV IGM SERPL QL IA: NEGATIVE
HBV CORE IGM SERPL QL IA: NEGATIVE
HBV SURFACE AG SERPL QL IA: NEGATIVE
HCV AB SERPL QL IA: NEGATIVE
HIV 1+2 AB+HIV1 P24 AG SERPL QL IA: NEGATIVE
RPR SER QL: NORMAL

## 2018-11-28 LAB
C TRACH DNA SPEC QL NAA+PROBE: NOT DETECTED
N GONORRHOEA DNA SPEC QL NAA+PROBE: NOT DETECTED

## 2018-12-10 ENCOUNTER — PATIENT MESSAGE (OUTPATIENT)
Dept: OBSTETRICS AND GYNECOLOGY | Facility: CLINIC | Age: 23
End: 2018-12-10

## 2018-12-11 RX ORDER — FLUCONAZOLE 200 MG/1
200 TABLET ORAL
Qty: 2 TABLET | Refills: 0 | Status: SHIPPED | OUTPATIENT
Start: 2018-12-11 | End: 2019-06-26 | Stop reason: SDUPTHER

## 2019-06-26 ENCOUNTER — PATIENT MESSAGE (OUTPATIENT)
Dept: OBSTETRICS AND GYNECOLOGY | Facility: CLINIC | Age: 24
End: 2019-06-26

## 2019-06-26 RX ORDER — FLUCONAZOLE 200 MG/1
200 TABLET ORAL
Qty: 2 TABLET | Refills: 0 | Status: SHIPPED | OUTPATIENT
Start: 2019-06-26 | End: 2019-09-17 | Stop reason: SDUPTHER

## 2019-08-15 ENCOUNTER — PATIENT MESSAGE (OUTPATIENT)
Dept: OBSTETRICS AND GYNECOLOGY | Facility: CLINIC | Age: 24
End: 2019-08-15

## 2019-09-17 ENCOUNTER — PATIENT MESSAGE (OUTPATIENT)
Dept: OBSTETRICS AND GYNECOLOGY | Facility: CLINIC | Age: 24
End: 2019-09-17

## 2019-09-17 RX ORDER — FLUCONAZOLE 200 MG/1
200 TABLET ORAL
Qty: 2 TABLET | Refills: 0 | Status: SHIPPED | OUTPATIENT
Start: 2019-09-17 | End: 2019-11-18 | Stop reason: SDUPTHER

## 2019-09-24 ENCOUNTER — OFFICE VISIT (OUTPATIENT)
Dept: OBSTETRICS AND GYNECOLOGY | Facility: CLINIC | Age: 24
End: 2019-09-24
Payer: MEDICAID

## 2019-09-24 VITALS
DIASTOLIC BLOOD PRESSURE: 78 MMHG | WEIGHT: 120.38 LBS | SYSTOLIC BLOOD PRESSURE: 116 MMHG | BODY MASS INDEX: 24.27 KG/M2 | HEIGHT: 59 IN

## 2019-09-24 DIAGNOSIS — Z30.42 ENCOUNTER FOR SURVEILLANCE OF INJECTABLE CONTRACEPTIVE: ICD-10-CM

## 2019-09-24 DIAGNOSIS — Z30.09 ENCOUNTER FOR OTHER GENERAL COUNSELING AND ADVICE ON CONTRACEPTION: Primary | ICD-10-CM

## 2019-09-24 LAB
B-HCG UR QL: NEGATIVE
CTP QC/QA: YES

## 2019-09-24 PROCEDURE — 99999 PR PBB SHADOW E&M-EST. PATIENT-LVL III: CPT | Mod: PBBFAC,,, | Performed by: NURSE PRACTITIONER

## 2019-09-24 PROCEDURE — 99213 PR OFFICE/OUTPT VISIT, EST, LEVL III, 20-29 MIN: ICD-10-PCS | Mod: S$PBB,,, | Performed by: NURSE PRACTITIONER

## 2019-09-24 PROCEDURE — 99213 OFFICE O/P EST LOW 20 MIN: CPT | Mod: S$PBB,,, | Performed by: NURSE PRACTITIONER

## 2019-09-24 PROCEDURE — 99999 PR PBB SHADOW E&M-EST. PATIENT-LVL III: ICD-10-PCS | Mod: PBBFAC,,, | Performed by: NURSE PRACTITIONER

## 2019-09-24 PROCEDURE — 81025 URINE PREGNANCY TEST: CPT | Mod: PBBFAC | Performed by: NURSE PRACTITIONER

## 2019-09-24 PROCEDURE — 99213 OFFICE O/P EST LOW 20 MIN: CPT | Mod: PBBFAC | Performed by: NURSE PRACTITIONER

## 2019-09-24 RX ORDER — MEDROXYPROGESTERONE ACETATE 150 MG/ML
150 INJECTION, SUSPENSION INTRAMUSCULAR
Qty: 1 ML | Refills: 2 | Status: SHIPPED | OUTPATIENT
Start: 2019-09-24 | End: 2019-12-02 | Stop reason: CLARIF

## 2019-09-24 NOTE — PROGRESS NOTES
Mikie Quigley received IM Depo Provera to the left upper outer side of the deltoid on 9/24/2019.    The patient was instructed to get the next injection on 12/10-12/24.    Lot Number: [id number]  Expiration Date: 06/2021  BY ANDREW RUSSO

## 2019-09-24 NOTE — PROGRESS NOTES
CC: birth control    HPI: Pt is a 23 y.o.  female who presents for birth control. Used to be on the depo, discontinued it d/t chest pain. Seen by her PCP who ruled out cardiac issues, concluded it was most likely due to stress or anxiety at the time. No chest pain since. Pt knows she cannot remember to take a pill daily. Hesitant about IUDs or Nexplanon.       ROS:  GENERAL: Feeling well overall. Denies fever or chills.   SKIN: Denies rash or lesions.   HEAD: Denies head injury or headache.   NODES: Denies enlarged lymph nodes.   CHEST: Denies chest pain or shortness of breath.   CARDIOVASCULAR: Denies palpitations or left sided chest pain.   ABDOMEN: No abdominal pain, constipation, diarrhea, nausea, vomiting or rectal bleeding.   URINARY: No dysuria, hematuria, or burning on urination.  REPRODUCTIVE: See HPI.   BREASTS: Denies pain, lumps, or nipple discharge.   HEMATOLOGIC: No easy bruisability or excessive bleeding.   MUSCULOSKELETAL: Denies joint pain or swelling.   NEUROLOGIC: Denies syncope or weakness.   PSYCHIATRIC: Denies depression, anxiety or mood swings.    PE:   APPEARANCE: Well nourished, well developed, White female in no acute distress.  PELVIC: Deferred, talk only      Diagnosis:  1. Encounter for other general counseling and advice on contraception    2. Encounter for surveillance of injectable contraceptive        Plan:     Orders Placed This Encounter    POCT urine pregnancy    medroxyPROGESTERone (DEPO-PROVERA) 150 mg/mL injection     1. UPT negative; pap current  2. Patient was counseled today on contraceptive options: barrier, hormonal (OCPs, Depo-Provera, NuvaRing, Nexplanon), IUDs (Mirena, ParaGard), etc.  3. Rx depo today in pharmacy    -Depo Provera use and potential side effects including altered menstrual bleeding pattern, weight gain, increased fracture risk due to reversible osteoporosis;  -osteoporosis prevention, calcium supplementation, regular weight bearing exercise;  -STDs  and prevention, including the HPV vaccine       Total duration face to face visit time 15 minutes.     Follow-up prn; will discontinue depo if chest pain recurs

## 2019-11-18 ENCOUNTER — PATIENT MESSAGE (OUTPATIENT)
Dept: OBSTETRICS AND GYNECOLOGY | Facility: CLINIC | Age: 24
End: 2019-11-18

## 2019-11-18 RX ORDER — FLUCONAZOLE 200 MG/1
200 TABLET ORAL
Qty: 2 TABLET | Refills: 0 | Status: SHIPPED | OUTPATIENT
Start: 2019-11-18 | End: 2019-11-18

## 2019-11-18 RX ORDER — FLUCONAZOLE 200 MG/1
200 TABLET ORAL
Qty: 2 TABLET | Refills: 0 | Status: SHIPPED | OUTPATIENT
Start: 2019-11-18 | End: 2019-12-02 | Stop reason: CLARIF

## 2019-11-23 ENCOUNTER — PATIENT MESSAGE (OUTPATIENT)
Dept: OBSTETRICS AND GYNECOLOGY | Facility: CLINIC | Age: 24
End: 2019-11-23

## 2019-12-02 ENCOUNTER — HOSPITAL ENCOUNTER (EMERGENCY)
Facility: HOSPITAL | Age: 24
Discharge: HOME | End: 2019-12-02
Attending: EMERGENCY MEDICINE
Payer: MEDICAID

## 2019-12-02 VITALS
OXYGEN SATURATION: 99 % | HEART RATE: 74 BPM | SYSTOLIC BLOOD PRESSURE: 108 MMHG | DIASTOLIC BLOOD PRESSURE: 74 MMHG | RESPIRATION RATE: 18 BRPM | HEIGHT: 59 IN | WEIGHT: 115 LBS | TEMPERATURE: 99 F | BODY MASS INDEX: 23.18 KG/M2

## 2019-12-02 DIAGNOSIS — B34.9 VIRAL ILLNESS: ICD-10-CM

## 2019-12-02 DIAGNOSIS — R11.2 NON-INTRACTABLE VOMITING WITH NAUSEA, UNSPECIFIED VOMITING TYPE: Primary | ICD-10-CM

## 2019-12-02 LAB
B-HCG UR QL: NEGATIVE
BILIRUBIN, POC UA: NEGATIVE
BLOOD, POC UA: NEGATIVE
CLARITY, POC UA: CLEAR
COLOR, POC UA: YELLOW
CTP QC/QA: YES
CTP QC/QA: YES
GLUCOSE, POC UA: NEGATIVE
KETONES, POC UA: NEGATIVE
LEUKOCYTE EST, POC UA: NEGATIVE
NITRITE, POC UA: NEGATIVE
PH UR STRIP: 7.5 [PH]
POC MOLECULAR INFLUENZA A AGN: NEGATIVE
POC MOLECULAR INFLUENZA B AGN: NEGATIVE
PROTEIN, POC UA: NEGATIVE
SPECIFIC GRAVITY, POC UA: 1.02
UROBILINOGEN, POC UA: 0.2 E.U./DL

## 2019-12-02 PROCEDURE — 81025 URINE PREGNANCY TEST: CPT | Mod: ER | Performed by: EMERGENCY MEDICINE

## 2019-12-02 PROCEDURE — 25000003 PHARM REV CODE 250: Mod: ER | Performed by: EMERGENCY MEDICINE

## 2019-12-02 PROCEDURE — 87804 INFLUENZA ASSAY W/OPTIC: CPT | Mod: ER

## 2019-12-02 PROCEDURE — 99283 EMERGENCY DEPT VISIT LOW MDM: CPT | Mod: ER

## 2019-12-02 PROCEDURE — 87502 INFLUENZA DNA AMP PROBE: CPT | Mod: ER

## 2019-12-02 PROCEDURE — 81003 URINALYSIS AUTO W/O SCOPE: CPT | Mod: ER

## 2019-12-02 RX ORDER — ONDANSETRON 4 MG/1
4 TABLET, FILM COATED ORAL EVERY 6 HOURS PRN
Qty: 15 TABLET | Refills: 0 | Status: SHIPPED | OUTPATIENT
Start: 2019-12-02 | End: 2020-10-15

## 2019-12-02 RX ORDER — MEDROXYPROGESTERONE ACETATE 150 MG/ML
150 INJECTION, SUSPENSION INTRAMUSCULAR
COMMUNITY
End: 2020-04-30

## 2019-12-02 RX ORDER — ONDANSETRON 4 MG/1
4 TABLET, ORALLY DISINTEGRATING ORAL
Status: COMPLETED | OUTPATIENT
Start: 2019-12-02 | End: 2019-12-02

## 2019-12-02 RX ADMIN — ONDANSETRON 4 MG: 4 TABLET, ORALLY DISINTEGRATING ORAL at 11:12

## 2019-12-02 NOTE — ED PROVIDER NOTES
Encounter Date: 12/2/2019    SCRIBE #1 NOTE: I, Marcie Butt, am scribing for, and in the presence of,  Dr. Griggs. I have scribed the following portions of the note - Other sections scribed: HPI, ROS, PE.       History     Chief Complaint   Patient presents with    Emesis     pt c/o bodyaches and vomiting since friday. pt states that the last time she vomited was last night.      Mikie Quigley is a 23 y.o. female who presents to the ED complaining of vomiting x3 days. Last emesis episode was last night. Feels nauseated in ED. Reports positive for body aches, congestion, chills, and frontal headaches. Denies sore throat, ear pain, abdominal pain, chest pain, and SOB. Positive sick contact with daughter that was tested positive for influenza B. Did not receive flu shot. No allergies. States she feels a little better today but needed a work excuse to be able to go back to work, per employer.     The history is provided by the patient. No  was used.     Review of patient's allergies indicates:  No Known Allergies  History reviewed. No pertinent past medical history.  Past Surgical History:   Procedure Laterality Date    CHOLECYSTECTOMY       Family History   Problem Relation Age of Onset    Breast cancer Neg Hx     Colon cancer Neg Hx     Ovarian cancer Neg Hx      Social History     Tobacco Use    Smoking status: Never Smoker    Smokeless tobacco: Never Used   Substance Use Topics    Alcohol use: No    Drug use: No     Review of Systems   Constitutional: Positive for chills.   HENT: Positive for congestion. Negative for ear pain and sore throat.    Respiratory: Negative for shortness of breath.    Cardiovascular: Negative for chest pain.   Gastrointestinal: Positive for nausea and vomiting. Negative for abdominal pain.   Neurological: Positive for headaches.   All other systems reviewed and are negative.      Physical Exam     Initial Vitals [12/02/19 1039]   BP Pulse Resp Temp  SpO2   112/66 81 18 98.5 °F (36.9 °C) 100 %      MAP       --         Physical Exam    Nursing note and vitals reviewed.  Constitutional: She appears well-developed and well-nourished. No distress.   HENT:   Head: Normocephalic and atraumatic.   Eyes: Conjunctivae are normal. Pupils are equal, round, and reactive to light.   Neck: Normal range of motion. Neck supple. No JVD present.   Cardiovascular: Normal rate, regular rhythm, normal heart sounds and intact distal pulses. Exam reveals no gallop and no friction rub.    No murmur heard.  Pulmonary/Chest: Breath sounds normal. No respiratory distress. She has no wheezes. She has no rhonchi. She has no rales. She exhibits no tenderness.   Abdominal: Soft. Bowel sounds are normal. There is no tenderness. There is no rebound and no guarding.   Musculoskeletal: Normal range of motion. She exhibits no edema.   Neurological: She is alert and oriented to person, place, and time. GCS score is 15. GCS eye subscore is 4. GCS verbal subscore is 5. GCS motor subscore is 6.   Skin: Skin is warm.   Psychiatric: She has a normal mood and affect.         ED Course   Procedures  Labs Reviewed   POCT INFLUENZA A/B MOLECULAR   POCT URINE PREGNANCY   POCT URINALYSIS W/O SCOPE   POCT URINALYSIS W/O SCOPE          Imaging Results    None          Medical Decision Making:   History:   Old Medical Records: I decided to obtain old medical records.  Clinical Tests:   Lab Tests: Ordered and Reviewed  ED Management:  Nontoxic, well-appearing.  Feels better in the ER.  Is clinically stable for discharge. Suspect viral syndrome.  Possible flu secondary to close exposure with 1-year-old daughter who tested positive yesterday per mom.  Symptoms have been present for 3 days, Tamiflu not indicated.  Discussed with mom.  We discussed home symptomatic supportive care.  We discussed home care and worrisome signs that should prompt need to return to the ER should they occur.  There is no indication for  further emergent intervention or evaluation at this time            Scribe Attestation:   Scribe #1: I performed the above scribed service and the documentation accurately describes the services I performed. I attest to the accuracy of the note.                          Clinical Impression:     1. Non-intractable vomiting with nausea, unspecified vomiting type    2. Viral illness                                Jayne Griggs MD  12/02/19 6511

## 2020-04-29 ENCOUNTER — PATIENT MESSAGE (OUTPATIENT)
Dept: OBSTETRICS AND GYNECOLOGY | Facility: CLINIC | Age: 25
End: 2020-04-29

## 2020-04-30 RX ORDER — LEVONORGESTREL AND ETHINYL ESTRADIOL 0.1-0.02MG
1 KIT ORAL DAILY
Qty: 28 TABLET | Refills: 2 | Status: SHIPPED | OUTPATIENT
Start: 2020-04-30 | End: 2020-05-26 | Stop reason: SDUPTHER

## 2020-05-26 ENCOUNTER — OFFICE VISIT (OUTPATIENT)
Dept: OBSTETRICS AND GYNECOLOGY | Facility: CLINIC | Age: 25
End: 2020-05-26
Payer: MEDICAID

## 2020-05-26 VITALS — BODY MASS INDEX: 24.4 KG/M2 | DIASTOLIC BLOOD PRESSURE: 60 MMHG | WEIGHT: 120.81 LBS | SYSTOLIC BLOOD PRESSURE: 102 MMHG

## 2020-05-26 DIAGNOSIS — Z30.41 ENCOUNTER FOR SURVEILLANCE OF CONTRACEPTIVE PILLS: Primary | ICD-10-CM

## 2020-05-26 DIAGNOSIS — Z01.419 VISIT FOR GYNECOLOGIC EXAMINATION: ICD-10-CM

## 2020-05-26 PROCEDURE — 99999 PR PBB SHADOW E&M-EST. PATIENT-LVL III: ICD-10-PCS | Mod: PBBFAC,,, | Performed by: NURSE PRACTITIONER

## 2020-05-26 PROCEDURE — 99395 PREV VISIT EST AGE 18-39: CPT | Mod: S$PBB,,, | Performed by: NURSE PRACTITIONER

## 2020-05-26 PROCEDURE — 99999 PR PBB SHADOW E&M-EST. PATIENT-LVL III: CPT | Mod: PBBFAC,,, | Performed by: NURSE PRACTITIONER

## 2020-05-26 PROCEDURE — 99213 OFFICE O/P EST LOW 20 MIN: CPT | Mod: PBBFAC | Performed by: NURSE PRACTITIONER

## 2020-05-26 PROCEDURE — 99395 PR PREVENTIVE VISIT,EST,18-39: ICD-10-PCS | Mod: S$PBB,,, | Performed by: NURSE PRACTITIONER

## 2020-05-26 RX ORDER — LEVONORGESTREL AND ETHINYL ESTRADIOL 0.1-0.02MG
1 KIT ORAL DAILY
Qty: 28 TABLET | Refills: 10 | Status: SHIPPED | OUTPATIENT
Start: 2020-05-26 | End: 2020-08-03

## 2020-05-26 NOTE — PROGRESS NOTES
CC: Annual  HPI: Pt is a 24 y.o.  female who presents for routine annual exam. She uses OCPs for contraception. She does not want STD screening. Last depo injection was in September 2019, started OCPs last month. She has not had a cycle yet. Two kids at home, interested in trying for a third later on.     FH:   Breast cancer: none  Colon cancer: none  Ovarian cancer: none    Flu vaccine: na  HPV vaccine: yes  Last pap smear: 2018  Colonoscopy: na  DEXA: na  Mammogram: na  STD history: chlamydia  Birth control: OCPs    ROS:  GENERAL: Feeling well overall. Denies fever or chills.   SKIN: Denies rash or lesions.   HEAD: Denies head injury or headache.   NODES: Denies enlarged lymph nodes.   CHEST: Denies chest pain or shortness of breath.   CARDIOVASCULAR: Denies palpitations or left sided chest pain.   ABDOMEN: No abdominal pain, constipation, diarrhea, nausea, vomiting or rectal bleeding.   URINARY: No dysuria, hematuria, or burning on urination.  REPRODUCTIVE: See HPI.   BREASTS: Denies pain, lumps, or nipple discharge.   HEMATOLOGIC: No easy bruisability or excessive bleeding.   MUSCULOSKELETAL: Denies joint pain or swelling.   NEUROLOGIC: Denies syncope or weakness.   PSYCHIATRIC: Denies depression, anxiety or mood swings.    PE:   APPEARANCE: Well nourished, well developed, White female in no acute distress.  NODES: no cervical, supraclavicular, or inguinal lymphadenopathy  BREASTS: Symmetrical, no skin changes or visible lesions. No palpable masses, nipple discharge or adenopathy bilaterally.  ABDOMEN: Soft. No tenderness or masses. No distention. No hernias palpated. No CVA tenderness.  VULVA: No lesions. Normal external female genitalia.  URETHRAL MEATUS: Normal size and location, no lesions, no prolapse.  URETHRA: No masses, tenderness, or prolapse.  VAGINA: Moist. No lesions or lacerations noted. No abnormal discharge present. No odor present.   CERVIX: No lesions or discharge. No cervical motion tenderness.    UTERUS: Normal size, regular shape, mobile, non-tender.  ADNEXA: No tenderness. No fullness or masses palpated in the adnexal regions.   ANUS PERINEUM: Normal.      Diagnosis:  1. Encounter for surveillance of contraceptive pills    2. Visit for gynecologic examination        Plan:     Orders Placed This Encounter    levonorgestrel-ethinyl estradiol (AVIANE,ALESSE,LESSINA) 0.1-20 mg-mcg per tablet     1. Pap current  2. Continue with OCPs; d/w pt amenorrhea from depo injection is 2/2 delay in return to ovulation and may last up to 12 months. She may or may not have a cycle while on OCPs.     Patient was counseled today on the new ACS guidelines for cervical cytology screening as well as the current recommendations for breast cancer screening. She was counseled to follow up with her PCP for other routine health maintenance. Counseling session lasted approximately 10 minutes, and all her questions were answered.    Follow-up with me in 1 year for routine exam; pap in 1 year.

## 2020-07-08 ENCOUNTER — OFFICE VISIT (OUTPATIENT)
Dept: URGENT CARE | Facility: CLINIC | Age: 25
End: 2020-07-08
Payer: MEDICAID

## 2020-07-08 VITALS
BODY MASS INDEX: 23.18 KG/M2 | WEIGHT: 115 LBS | OXYGEN SATURATION: 96 % | RESPIRATION RATE: 18 BRPM | HEART RATE: 81 BPM | TEMPERATURE: 98 F | SYSTOLIC BLOOD PRESSURE: 106 MMHG | HEIGHT: 59 IN | DIASTOLIC BLOOD PRESSURE: 72 MMHG

## 2020-07-08 DIAGNOSIS — R11.10 VOMITING, INTRACTABILITY OF VOMITING NOT SPECIFIED, PRESENCE OF NAUSEA NOT SPECIFIED, UNSPECIFIED VOMITING TYPE: Primary | ICD-10-CM

## 2020-07-08 DIAGNOSIS — R11.0 NAUSEA: ICD-10-CM

## 2020-07-08 DIAGNOSIS — A08.4 VIRAL GASTROENTERITIS: ICD-10-CM

## 2020-07-08 LAB
B-HCG UR QL: NEGATIVE
CTP QC/QA: YES

## 2020-07-08 PROCEDURE — U0003 INFECTIOUS AGENT DETECTION BY NUCLEIC ACID (DNA OR RNA); SEVERE ACUTE RESPIRATORY SYNDROME CORONAVIRUS 2 (SARS-COV-2) (CORONAVIRUS DISEASE [COVID-19]), AMPLIFIED PROBE TECHNIQUE, MAKING USE OF HIGH THROUGHPUT TECHNOLOGIES AS DESCRIBED BY CMS-2020-01-R: HCPCS

## 2020-07-08 PROCEDURE — 99214 PR OFFICE/OUTPT VISIT, EST, LEVL IV, 30-39 MIN: ICD-10-PCS | Mod: S$GLB,,, | Performed by: NURSE PRACTITIONER

## 2020-07-08 PROCEDURE — 99214 OFFICE O/P EST MOD 30 MIN: CPT | Mod: S$GLB,,, | Performed by: NURSE PRACTITIONER

## 2020-07-08 RX ORDER — ONDANSETRON 4 MG/1
4 TABLET, ORALLY DISINTEGRATING ORAL EVERY 6 HOURS PRN
Qty: 30 TABLET | Refills: 0 | Status: SHIPPED | OUTPATIENT
Start: 2020-07-08 | End: 2020-10-15

## 2020-07-08 NOTE — PROGRESS NOTES
"Subjective:       Patient ID: Mikie Quigley is a 24 y.o. female.    Vitals:  height is 4' 11" (1.499 m) and weight is 52.2 kg (115 lb). Her temperature is 98.2 °F (36.8 °C). Her blood pressure is 106/72 and her pulse is 81. Her respiration is 18 and oxygen saturation is 96%.     Chief Complaint: Emesis    Pt reports nausea, vomiting, diarrhea, chills, and sweats since last night. She can only tolerated small sips of liquid. No fever, cough, sore throat, abd pain, headache, anosmia. No other sick contacts.     Emesis   This is a new problem. The current episode started yesterday. The problem occurs 2 to 4 times per day. The problem has been unchanged. The emesis has an appearance of stomach contents. There has been no fever. Associated symptoms include chills and diarrhea. Pertinent negatives include no arthralgias, chest pain, coughing, dizziness, fever, headaches or myalgias. She has tried nothing for the symptoms. The treatment provided no relief.       Constitution: Positive for chills and sweating. Negative for fatigue and fever.   HENT: Negative for congestion and sore throat.    Neck: Negative for painful lymph nodes.   Cardiovascular: Negative for chest pain and leg swelling.   Eyes: Negative for double vision and blurred vision.   Respiratory: Negative for cough and shortness of breath.    Gastrointestinal: Positive for nausea, vomiting and diarrhea.   Genitourinary: Negative for dysuria, frequency, urgency and history of kidney stones.   Musculoskeletal: Negative for joint pain, joint swelling, muscle cramps and muscle ache.   Skin: Negative for color change, pale, rash and bruising.   Allergic/Immunologic: Negative for seasonal allergies.   Neurological: Negative for dizziness, history of vertigo, light-headedness, passing out and headaches.   Hematologic/Lymphatic: Negative for swollen lymph nodes.   Psychiatric/Behavioral: Negative for nervous/anxious, sleep disturbance and depression. The patient " is not nervous/anxious.        Objective:      Physical Exam   Constitutional: She is oriented to person, place, and time. She appears well-developed.   HENT:   Head: Normocephalic and atraumatic.   Right Ear: External ear normal.   Left Ear: External ear normal.   Nose: Nose normal.   Mouth/Throat: Mucous membranes are normal.   Eyes: Conjunctivae and lids are normal.   Neck: Trachea normal and full passive range of motion without pain. Neck supple.   Cardiovascular: Normal rate, regular rhythm and normal heart sounds.   Pulmonary/Chest: Effort normal and breath sounds normal. No respiratory distress. She has no wheezes. She has no rales.   Abdominal: Soft. Normal appearance and bowel sounds are normal. She exhibits no distension, no abdominal bruit, no pulsatile midline mass and no mass. There is no abdominal tenderness. There is no rebound, no guarding, no tenderness at McBurney's point and negative So's sign.   abd soft, nontender   Musculoskeletal: Normal range of motion.   Neurological: She is alert and oriented to person, place, and time. She has normal strength.   Skin: Skin is warm, dry, intact, not diaphoretic and not pale.   Psychiatric: Her speech is normal and behavior is normal. Judgment and thought content normal.   Nursing note and vitals reviewed.    Results for orders placed or performed in visit on 07/08/20   POCT urine pregnancy   Result Value Ref Range    POC Preg Test, Ur Negative Negative     Acceptable Yes            Assessment:       1. Vomiting, intractability of vomiting not specified, presence of nausea not specified, unspecified vomiting type    2. Nausea    3. Viral gastroenteritis        Plan:         Vomiting, intractability of vomiting not specified, presence of nausea not specified, unspecified vomiting type  -     POCT urine pregnancy  -     ondansetron (ZOFRAN-ODT) 4 MG TbDL; Take 1 tablet (4 mg total) by mouth every 6 (six) hours as needed.  Dispense: 30  tablet; Refill: 0    Nausea  -     ondansetron (ZOFRAN-ODT) 4 MG TbDL; Take 1 tablet (4 mg total) by mouth every 6 (six) hours as needed.  Dispense: 30 tablet; Refill: 0  -     COVID-19 Routine Screening    Viral gastroenteritis         Reviewed previous pertinent office visits, PMH, PSH, fam hx  We discussed that this is likely a viral illness and will not benefit from antibiotics. Symptomatic care recommended.   covid test pending. Isolation guidelines discussed.  Advised on return/follow-up precautions. Advised on ER precautions. Answered all patient questions. Patient verbalized understanding and voiced agreement with current treatment plan.    Patient Instructions   Instructions for Patients with Confirmed or Suspected COVID-19    If you are awaiting your test result, you will either be called or it will be released to the patient portal.  If you have any questions about your test, please visit [email or web address] or call our COVID-19 information line at [phone].      Preventing the Spread of Coronavirus Disease 2019 (COVID-19) in Homes and Residential Communities -- Patients     Prevention steps for people with confirmed or suspected COVID-19 (including persons under investigation) who do not need to be hospitalized and people with confirmed COVID-19 who were hospitalized and determined to be medically stable to go home.      Stay home except to get medical care.    Separate yourself from other people and animals in your home.    Call ahead before visiting your doctor.    Wear a face mask.    Cover your coughs and sneezes.    Clean your hands often.    Avoid sharing personal household items.    Clean all high-touch surfaces every day.    Monitor your symptoms. Seek prompt medical attention if your illness is worsening (e.g., difficulty breathing). Before seeking care, call your healthcare provider.    If you have a medical emergency and must call 911, notify the dispatcher that you  have or are being evaluated for COVID-19. If possible, put on a face mask before emergency medical services arrive.    Use the following symptom-based strategy to return to normal activity following a suspected or confirmed case of COVID-19. Continue isolation until:   o At least 3 days (72 hours) have passed since recovery defined as resolution of fever without the use of fever-reducing medications and improvement in respiratory symptoms (e.g. cough, shortness of breath), and   o At least 10 days have passed since symptoms first appeared.     Precautions for household members, intimate partners and caregivers in a non-healthcare setting of a patient with symptomatic laboratory-confirmed COVID-19 or a patient under investigation.     Household members, intimate partners and caregivers in a non-healthcare setting may have close contact with a person with symptomatic, laboratory-confirmed COVID-19 or a person under investigation. Close contacts should monitor their health; they should call their healthcare provider right away if they develop symptoms suggestive of COVID-19 (e.g., fever, cough, shortness of breath). Close contacts should also follow these recommendations:     · Stay home for the duration of the time recommended by healthcare provider, except to get medical care. Separate yourself from other people and animals in the home.  · Monitor the patients symptoms. If the patient is getting sicker, call his or her healthcare provider. If the patient has a medical emergency and you need to call 911, notify the dispatch personnel that the patient has or is being evaluated for COVID-19.   · Wear a facemask when around other people such as sharing a room or vehicle and before entering a healthcare provider's office.  · Cover coughs and sneezes with a tissue. Throw used tissues in a lined trash can immediately and wash hands.  · Clean hands often with soap and water for at least 20 seconds or with an alcohol-based  hand , rubbing hands together until they feel dry. Avoid touching your eyes, nose, and mouth with unwashed hands.  · Clean all high-touch; surfaces every day, including counters, tabletops, doorknobs, bathroom fixtures, toilets, phones, keyboards, tablets, bedside tables, etc. Use a household cleaning spray or wipe according to label instructions.  · Avoid sharing personal household items such as dishes, drinking glasses, cups, towels, bedding, etc. After these items are used, they should be washed thoroughly with soap and water.  · Use the following symptom-based strategy to return to normal activity following a suspected or confirmed case of COVID-19. Continue isolation until:   · At least 3 days (72 hours) have passed since recovery defined as resolution of fever without the use of fever-reducing medications and improvement in respiratory symptoms (e.g. cough, shortness of breath), and   · At least 10 days have passed since symptoms first appeared.

## 2020-07-08 NOTE — PATIENT INSTRUCTIONS

## 2020-07-08 NOTE — LETTER
July 8, 2020      [facility] [address], [address] [place] [address]  Phone: [phone]  Fax: [phone]       Patient: Mikie Quigley   YOB: 1995  Date of Visit: 07/08/2020    To Whom It May Concern:    Jose Quigley  was at [facility] on 07/08/2020. She is being tested for covid and cannot go back to work until results are available which could take up to 72 hours. If you have any questions or concerns, or if I can be of further assistance, please do not hesitate to contact me.    Sincerely,      Deborah Silva, NP

## 2020-07-11 LAB — SARS-COV-2 RNA RESP QL NAA+PROBE: NOT DETECTED

## 2020-07-30 ENCOUNTER — PATIENT MESSAGE (OUTPATIENT)
Dept: OBSTETRICS AND GYNECOLOGY | Facility: CLINIC | Age: 25
End: 2020-07-30

## 2020-08-03 ENCOUNTER — PATIENT MESSAGE (OUTPATIENT)
Dept: OBSTETRICS AND GYNECOLOGY | Facility: CLINIC | Age: 25
End: 2020-08-03

## 2020-08-03 RX ORDER — FLUCONAZOLE 200 MG/1
200 TABLET ORAL
Qty: 2 TABLET | Refills: 0 | Status: SHIPPED | OUTPATIENT
Start: 2020-08-03 | End: 2020-10-15

## 2020-10-15 ENCOUNTER — OFFICE VISIT (OUTPATIENT)
Dept: OBSTETRICS AND GYNECOLOGY | Facility: CLINIC | Age: 25
End: 2020-10-15
Payer: MEDICAID

## 2020-10-15 ENCOUNTER — LAB VISIT (OUTPATIENT)
Dept: LAB | Facility: OTHER | Age: 25
End: 2020-10-15
Attending: NURSE PRACTITIONER
Payer: MEDICAID

## 2020-10-15 VITALS
DIASTOLIC BLOOD PRESSURE: 80 MMHG | SYSTOLIC BLOOD PRESSURE: 118 MMHG | BODY MASS INDEX: 24.85 KG/M2 | WEIGHT: 123.25 LBS | HEIGHT: 59 IN

## 2020-10-15 DIAGNOSIS — Z78.9 ATTEMPTING TO CONCEIVE: ICD-10-CM

## 2020-10-15 DIAGNOSIS — Z78.9 ATTEMPTING TO CONCEIVE: Primary | ICD-10-CM

## 2020-10-15 DIAGNOSIS — N92.1 IRREGULAR INTERMENSTRUAL BLEEDING: ICD-10-CM

## 2020-10-15 LAB
FSH SERPL-ACNC: 3.3 MIU/ML
HCG INTACT+B SERPL-ACNC: <1.2 MIU/ML
TSH SERPL DL<=0.005 MIU/L-ACNC: 1.29 UIU/ML (ref 0.4–4)

## 2020-10-15 PROCEDURE — 84443 ASSAY THYROID STIM HORMONE: CPT

## 2020-10-15 PROCEDURE — 83520 IMMUNOASSAY QUANT NOS NONAB: CPT

## 2020-10-15 PROCEDURE — 84702 CHORIONIC GONADOTROPIN TEST: CPT

## 2020-10-15 PROCEDURE — 99999 PR PBB SHADOW E&M-EST. PATIENT-LVL III: CPT | Mod: PBBFAC,,, | Performed by: NURSE PRACTITIONER

## 2020-10-15 PROCEDURE — 83001 ASSAY OF GONADOTROPIN (FSH): CPT

## 2020-10-15 PROCEDURE — 99213 OFFICE O/P EST LOW 20 MIN: CPT | Mod: S$PBB,,, | Performed by: NURSE PRACTITIONER

## 2020-10-15 PROCEDURE — 99213 OFFICE O/P EST LOW 20 MIN: CPT | Mod: PBBFAC | Performed by: NURSE PRACTITIONER

## 2020-10-15 PROCEDURE — 99999 PR PBB SHADOW E&M-EST. PATIENT-LVL III: ICD-10-PCS | Mod: PBBFAC,,, | Performed by: NURSE PRACTITIONER

## 2020-10-15 PROCEDURE — 99213 PR OFFICE/OUTPT VISIT, EST, LEVL III, 20-29 MIN: ICD-10-PCS | Mod: S$PBB,,, | Performed by: NURSE PRACTITIONER

## 2020-10-15 NOTE — PROGRESS NOTES
"Mikie Quigley is a 24 y.o. female  presents with complaint of irregular cycles. She is attempting to conceive. She had been on depo and was not having a cycle for months after discontinuing it-had her first period in September (lasted normal x 7 days) and then started bleeding again about 2 weeks later (very light x 2-3 days). She is curious if she is ovulating. She is taking a gummy prenatal. She has not tried OPKs. She did not have any issues getting pregnant with her daughter in 2018.    Last pap in 2018 negative  Hx of chlamydia    History reviewed. No pertinent past medical history.  Past Surgical History:   Procedure Laterality Date    CHOLECYSTECTOMY       Social History     Tobacco Use    Smoking status: Never Smoker    Smokeless tobacco: Never Used   Substance Use Topics    Alcohol use: Yes     Comment: socially    Drug use: No     Family History   Problem Relation Age of Onset    No Known Problems Father     No Known Problems Mother     Colon cancer Paternal Grandmother 80    Breast cancer Neg Hx     Ovarian cancer Neg Hx      OB History    Para Term  AB Living   2 2 1 1 0 2   SAB TAB Ectopic Multiple Live Births   0 0 0 0 2      # Outcome Date GA Lbr Mauricio/2nd Weight Sex Delivery Anes PTL Lv   2 Term 18 39w1d  2.841 kg (6 lb 4.2 oz) F  Spinal, EPI N NELLY   1  16 34w0d  2.438 kg (5 lb 6 oz) F Vag-Spont EPI, Spinal N NELLY      Complications: Preeclampsia       /80   Ht 4' 11" (1.499 m)   Wt 55.9 kg (123 lb 3.8 oz)   LMP 10/02/2020   BMI 24.89 kg/m²     ROS:  GENERAL: No fever, chills, fatigability or weight loss.  VULVAR: No pain, no lesions and no itching.  VAGINAL: No relaxation, no itching, no discharge, no abnormal bleeding and no lesions.  ABDOMEN: No abdominal pain. Denies nausea. Denies vomiting. No diarrhea. No constipation  BREAST: Denies pain. No lumps. No discharge.  URINARY: No incontinence, no nocturia, no frequency and no " dysuria.  CARDIOVASCULAR: No chest pain. No shortness of breath. No leg cramps.  NEUROLOGICAL: No headaches. No vision changes.    PHYSICAL EXAM:  PELVIC NOT DONE-TALK ONLY  ASSESSMENT and PLAN:    ICD-10-CM ICD-9-CM    1. Attempting to conceive  Z78.9 V49.89 TSH      Antimullerian hormone (AMH)      HCG, Quantitative      Follicle Stimulating Hormone   2. Irregular intermenstrual bleeding  N92.1 626.6          Basic labs today, discussed tracking cycles and RTO for CD 21 progesterone  Start prenatal vitamin and OPKs for timed intercourse    I discussed with the patient today that if a woman has regular periods and is under the age of 35, she can try for 1 year before seeing a fertility specialist and/or starting a basic workup.  If patients are having regular cycles and are 35-39 years old, patients can try for 6 months before seeing a fertility specialist.   If patients are 40 years old or older, have irregular cycles, or have known infertility risk factors, patients should see a fertility specialist right away.

## 2020-10-16 ENCOUNTER — PATIENT MESSAGE (OUTPATIENT)
Dept: OBSTETRICS AND GYNECOLOGY | Facility: CLINIC | Age: 25
End: 2020-10-16

## 2020-10-20 LAB — MIS SERPL-MCNC: 2.6 NG/ML (ref 0.9–9.5)

## 2020-11-04 ENCOUNTER — PATIENT MESSAGE (OUTPATIENT)
Dept: OBSTETRICS AND GYNECOLOGY | Facility: CLINIC | Age: 25
End: 2020-11-04

## 2020-11-05 ENCOUNTER — PATIENT MESSAGE (OUTPATIENT)
Dept: OBSTETRICS AND GYNECOLOGY | Facility: CLINIC | Age: 25
End: 2020-11-05

## 2020-11-09 ENCOUNTER — PATIENT MESSAGE (OUTPATIENT)
Dept: OBSTETRICS AND GYNECOLOGY | Facility: CLINIC | Age: 25
End: 2020-11-09

## 2020-11-10 ENCOUNTER — TELEPHONE (OUTPATIENT)
Dept: OBSTETRICS AND GYNECOLOGY | Facility: CLINIC | Age: 25
End: 2020-11-10

## 2020-11-10 DIAGNOSIS — Z78.9 ATTEMPTING TO CONCEIVE: Primary | ICD-10-CM

## 2020-11-16 ENCOUNTER — LAB VISIT (OUTPATIENT)
Dept: LAB | Facility: OTHER | Age: 25
End: 2020-11-16
Attending: NURSE PRACTITIONER
Payer: MEDICAID

## 2020-11-16 DIAGNOSIS — Z78.9 ATTEMPTING TO CONCEIVE: ICD-10-CM

## 2020-11-16 PROCEDURE — 36415 COLL VENOUS BLD VENIPUNCTURE: CPT

## 2020-11-16 PROCEDURE — 84144 ASSAY OF PROGESTERONE: CPT

## 2020-11-17 LAB — PROGEST SERPL-MCNC: 0.2 NG/ML

## 2020-11-18 ENCOUNTER — PATIENT MESSAGE (OUTPATIENT)
Dept: OBSTETRICS AND GYNECOLOGY | Facility: CLINIC | Age: 25
End: 2020-11-18

## 2020-12-08 ENCOUNTER — OFFICE VISIT (OUTPATIENT)
Dept: OBSTETRICS AND GYNECOLOGY | Facility: CLINIC | Age: 25
End: 2020-12-08
Payer: MEDICAID

## 2020-12-08 VITALS
BODY MASS INDEX: 24.13 KG/M2 | SYSTOLIC BLOOD PRESSURE: 102 MMHG | HEIGHT: 59 IN | WEIGHT: 119.69 LBS | DIASTOLIC BLOOD PRESSURE: 70 MMHG

## 2020-12-08 DIAGNOSIS — N97.0 FEMALE INFERTILITY ASSOCIATED WITH ANOVULATION: Primary | ICD-10-CM

## 2020-12-08 PROCEDURE — 99213 OFFICE O/P EST LOW 20 MIN: CPT | Mod: S$PBB,,, | Performed by: OBSTETRICS & GYNECOLOGY

## 2020-12-08 PROCEDURE — 99999 PR PBB SHADOW E&M-EST. PATIENT-LVL II: CPT | Mod: PBBFAC,,, | Performed by: OBSTETRICS & GYNECOLOGY

## 2020-12-08 PROCEDURE — 99212 OFFICE O/P EST SF 10 MIN: CPT | Mod: PBBFAC | Performed by: OBSTETRICS & GYNECOLOGY

## 2020-12-08 PROCEDURE — 99999 PR PBB SHADOW E&M-EST. PATIENT-LVL II: ICD-10-PCS | Mod: PBBFAC,,, | Performed by: OBSTETRICS & GYNECOLOGY

## 2020-12-08 PROCEDURE — 99213 PR OFFICE/OUTPT VISIT, EST, LEVL III, 20-29 MIN: ICD-10-PCS | Mod: S$PBB,,, | Performed by: OBSTETRICS & GYNECOLOGY

## 2020-12-08 NOTE — PROGRESS NOTES
Subjective:       Patient ID: Mikie Quigley is a 25 y.o. female.    Chief Complaint:  Advice Only    History of Present Illness  Gynecologic Exam  The patient's pertinent negatives include no genital itching, genital lesions, genital odor, genital rash, missed menses, pelvic pain, vaginal bleeding or vaginal discharge. She is not pregnant. Associated symptoms include back pain, frequency, headaches, nausea and urgency. Pertinent negatives include no abdominal pain, anorexia, chills, constipation, diarrhea, discolored urine, dysuria, fever, flank pain, hematuria, painful intercourse, rash or vomiting. The vaginal discharge was clear. The vaginal bleeding is heavier than menses. She has been passing clots. She has not been passing tissue. The symptoms are aggravated by bowel movements. She has tried acetaminophen and warm bath for the symptoms. She is sexually active. No, her partner does not have an STD. She uses nothing for contraception. Her menstrual history has been irregular. Her past medical history is significant for menorrhagia. There is no history of an abdominal surgery, a  section, an ectopic pregnancy, endometriosis, a gynecological surgery, herpes simplex, metrorrhagia, miscarriage, ovarian cysts, perineal abscess, PID, an STD, a terminated pregnancy or vaginosis.     26 y/o  presents for consult for infertility. LMP 20. She has been attempting pregnancy for the past year. She was on Depo Provera in the past, last dose 2019. Cycles regular.     She had progesterone level drawn on 20 that was 0.2, but this was drawn only 5 days after a +OPK. TSH wnl.    No issues conceiving in the past. Current partner has a 4 year old child.     GYN & OB History  Patient's last menstrual period was 2020.   Date of Last Pap: No result found    OB History    Para Term  AB Living   2 2 1 1 0 2   SAB TAB Ectopic Multiple Live Births   0 0 0 0 2      # Outcome Date GA  Lbr Mauricio/2nd Weight Sex Delivery Anes PTL Lv   2 Term 18 39w1d  2.841 kg (6 lb 4.2 oz) F  Spinal, EPI N NELLY   1  16 34w0d  2.438 kg (5 lb 6 oz) F Vag-Spont EPI, Spinal N NELLY      Complications: Preeclampsia       Review of Systems  Review of Systems   Constitutional: Negative for chills, diaphoresis, fatigue and fever.   Respiratory: Negative for cough and shortness of breath.    Cardiovascular: Negative for chest pain and palpitations.   Gastrointestinal: Positive for nausea. Negative for abdominal pain, anorexia, constipation, diarrhea and vomiting.   Genitourinary: Positive for frequency, menorrhagia and urgency. Negative for dyspareunia, dysuria, flank pain, hematuria, menstrual problem, missed menses, pelvic pain, vaginal bleeding, vaginal discharge and vaginal pain.   Musculoskeletal: Positive for back pain.   Integumentary:  Negative for rash.   Neurological: Positive for headaches.   Psychiatric/Behavioral: Negative for depression. The patient is not nervous/anxious.            Objective:    Physical Exam:   Constitutional: She is oriented to person, place, and time. She appears well-developed and well-nourished. No distress.    HENT:   Head: Normocephalic and atraumatic.    Eyes: EOM are normal.    Neck: Normal range of motion.     Pulmonary/Chest: Effort normal.          Genitourinary:    Genitourinary Comments: deferred             Musculoskeletal: Normal range of motion and moves all extremeties.       Neurological: She is alert and oriented to person, place, and time.    Skin: No rash noted.    Psychiatric: She has a normal mood and affect. Her behavior is normal. Judgment and thought content normal.          Assessment:        1. Female infertility associated with anovulation            Plan:      Mikie was seen today for advice only.    Diagnoses and all orders for this visit:    Female infertility associated with anovulation  -     Prolactin; Future  -     Progesterone;  Future  UPT neg.   Recommended checking progesterone level 7 days after +OPK peak. Will check prolactin as well.   Discussed partner SA prior to OI medications (if indicated).     Orders Placed This Encounter   Procedures    Prolactin    Progesterone       No follow-ups on file.

## 2020-12-28 ENCOUNTER — PATIENT MESSAGE (OUTPATIENT)
Dept: OBSTETRICS AND GYNECOLOGY | Facility: CLINIC | Age: 25
End: 2020-12-28

## 2020-12-29 DIAGNOSIS — N94.6 DYSMENORRHEA: Primary | ICD-10-CM

## 2020-12-29 RX ORDER — NAPROXEN 500 MG/1
550 TABLET ORAL EVERY 12 HOURS PRN
Qty: 30 TABLET | Refills: 1 | Status: SHIPPED | OUTPATIENT
Start: 2020-12-29 | End: 2021-03-05

## 2020-12-29 RX ORDER — ONDANSETRON 4 MG/1
4 TABLET, ORALLY DISINTEGRATING ORAL EVERY 6 HOURS PRN
Qty: 30 TABLET | Refills: 0 | Status: SHIPPED | OUTPATIENT
Start: 2020-12-29 | End: 2021-04-13

## 2021-01-15 ENCOUNTER — PATIENT MESSAGE (OUTPATIENT)
Dept: OBSTETRICS AND GYNECOLOGY | Facility: CLINIC | Age: 26
End: 2021-01-15

## 2021-01-19 RX ORDER — FLUCONAZOLE 200 MG/1
200 TABLET ORAL
Qty: 2 TABLET | Refills: 0 | Status: SHIPPED | OUTPATIENT
Start: 2021-01-19 | End: 2021-04-13

## 2021-03-05 ENCOUNTER — OFFICE VISIT (OUTPATIENT)
Dept: OBSTETRICS AND GYNECOLOGY | Facility: CLINIC | Age: 26
End: 2021-03-05
Payer: MEDICAID

## 2021-03-05 ENCOUNTER — PATIENT MESSAGE (OUTPATIENT)
Dept: MATERNAL FETAL MEDICINE | Facility: CLINIC | Age: 26
End: 2021-03-05

## 2021-03-05 VITALS
WEIGHT: 126.88 LBS | BODY MASS INDEX: 25.58 KG/M2 | DIASTOLIC BLOOD PRESSURE: 70 MMHG | HEIGHT: 59 IN | SYSTOLIC BLOOD PRESSURE: 114 MMHG

## 2021-03-05 DIAGNOSIS — Z32.01 PREGNANCY CONFIRMED BY POSITIVE URINE TEST: Primary | ICD-10-CM

## 2021-03-05 PROCEDURE — 99999 PR PBB SHADOW E&M-EST. PATIENT-LVL III: CPT | Mod: PBBFAC,,, | Performed by: OBSTETRICS & GYNECOLOGY

## 2021-03-05 PROCEDURE — 87624 HPV HI-RISK TYP POOLED RSLT: CPT | Performed by: OBSTETRICS & GYNECOLOGY

## 2021-03-05 PROCEDURE — 87491 CHLMYD TRACH DNA AMP PROBE: CPT | Performed by: OBSTETRICS & GYNECOLOGY

## 2021-03-05 PROCEDURE — 99999 PR PBB SHADOW E&M-EST. PATIENT-LVL III: ICD-10-PCS | Mod: PBBFAC,,, | Performed by: OBSTETRICS & GYNECOLOGY

## 2021-03-05 PROCEDURE — 81025 URINE PREGNANCY TEST: CPT | Mod: PBBFAC | Performed by: OBSTETRICS & GYNECOLOGY

## 2021-03-05 PROCEDURE — 99213 OFFICE O/P EST LOW 20 MIN: CPT | Mod: PBBFAC,TH | Performed by: OBSTETRICS & GYNECOLOGY

## 2021-03-05 PROCEDURE — 99204 OFFICE O/P NEW MOD 45 MIN: CPT | Mod: S$PBB,TH,, | Performed by: OBSTETRICS & GYNECOLOGY

## 2021-03-05 PROCEDURE — 88175 CYTOPATH C/V AUTO FLUID REDO: CPT | Performed by: OBSTETRICS & GYNECOLOGY

## 2021-03-05 PROCEDURE — 87591 N.GONORRHOEAE DNA AMP PROB: CPT | Performed by: OBSTETRICS & GYNECOLOGY

## 2021-03-05 PROCEDURE — 87625 HPV TYPES 16 & 18 ONLY: CPT | Performed by: OBSTETRICS & GYNECOLOGY

## 2021-03-05 PROCEDURE — 99204 PR OFFICE/OUTPT VISIT, NEW, LEVL IV, 45-59 MIN: ICD-10-PCS | Mod: S$PBB,TH,, | Performed by: OBSTETRICS & GYNECOLOGY

## 2021-03-05 PROCEDURE — 87086 URINE CULTURE/COLONY COUNT: CPT | Performed by: OBSTETRICS & GYNECOLOGY

## 2021-03-07 LAB — BACTERIA UR CULT: NORMAL

## 2021-03-08 LAB
C TRACH DNA SPEC QL NAA+PROBE: NOT DETECTED
N GONORRHOEA DNA SPEC QL NAA+PROBE: NOT DETECTED

## 2021-03-11 ENCOUNTER — PATIENT MESSAGE (OUTPATIENT)
Dept: OBSTETRICS AND GYNECOLOGY | Facility: CLINIC | Age: 26
End: 2021-03-11

## 2021-03-17 ENCOUNTER — PATIENT MESSAGE (OUTPATIENT)
Dept: OBSTETRICS AND GYNECOLOGY | Facility: CLINIC | Age: 26
End: 2021-03-17

## 2021-03-17 LAB
CLINICAL INFO: ABNORMAL
CYTO CVX: ABNORMAL
CYTOLOGIST CVX/VAG CYTO: ABNORMAL
CYTOLOGY CMNT CVX/VAG CYTO-IMP: ABNORMAL
CYTOLOGY PAP THIN PREP EXPLANATION: ABNORMAL
DATE OF PREVIOUS PAP: ABNORMAL
DATE PREVIOUS BX: NO
GEN CATEG CVX/VAG CYTO-IMP: ABNORMAL
LMP START DATE: ABNORMAL
PATHOLOGIST CVX/VAG CYTO: ABNORMAL
SPECIMEN SOURCE CVX/VAG CYTO: ABNORMAL
STAT OF ADQ CVX/VAG CYTO-IMP: ABNORMAL

## 2021-03-18 LAB
HPV I/H RISK 4 DNA CVX QL NAA+PROBE: DETECTED
HPV16 DNA CVX QL PROBE+SIG AMP: NOT DETECTED
HPV18 DNA CVX QL PROBE+SIG AMP: NOT DETECTED

## 2021-03-21 ENCOUNTER — PATIENT MESSAGE (OUTPATIENT)
Dept: OBSTETRICS AND GYNECOLOGY | Facility: CLINIC | Age: 26
End: 2021-03-21

## 2021-03-22 DIAGNOSIS — O21.9 NAUSEA AND VOMITING DURING PREGNANCY: Primary | ICD-10-CM

## 2021-03-22 RX ORDER — ONDANSETRON 4 MG/1
4 TABLET, FILM COATED ORAL EVERY 8 HOURS PRN
Qty: 30 TABLET | Refills: 0 | Status: SHIPPED | OUTPATIENT
Start: 2021-03-22 | End: 2021-08-17

## 2021-03-25 ENCOUNTER — PATIENT MESSAGE (OUTPATIENT)
Dept: MATERNAL FETAL MEDICINE | Facility: CLINIC | Age: 26
End: 2021-03-25

## 2021-03-26 ENCOUNTER — PROCEDURE VISIT (OUTPATIENT)
Dept: MATERNAL FETAL MEDICINE | Facility: CLINIC | Age: 26
End: 2021-03-26
Payer: MEDICAID

## 2021-03-26 DIAGNOSIS — Z36.89 ENCOUNTER FOR FETAL ANATOMIC SURVEY: Primary | ICD-10-CM

## 2021-03-26 DIAGNOSIS — Z32.01 PREGNANCY CONFIRMED BY POSITIVE URINE TEST: Primary | ICD-10-CM

## 2021-03-26 PROCEDURE — 76801 OB US < 14 WKS SINGLE FETUS: CPT | Mod: 26,S$PBB,, | Performed by: OBSTETRICS & GYNECOLOGY

## 2021-03-26 PROCEDURE — 76801 OB US < 14 WKS SINGLE FETUS: CPT | Mod: PBBFAC,PO | Performed by: OBSTETRICS & GYNECOLOGY

## 2021-03-26 PROCEDURE — 76801 PR US, OB <14WKS, TRANSABD, SINGLE GESTATION: ICD-10-PCS | Mod: 26,S$PBB,, | Performed by: OBSTETRICS & GYNECOLOGY

## 2021-04-13 ENCOUNTER — PROCEDURE VISIT (OUTPATIENT)
Dept: OBSTETRICS AND GYNECOLOGY | Facility: CLINIC | Age: 26
End: 2021-04-13
Payer: MEDICAID

## 2021-04-13 ENCOUNTER — INITIAL PRENATAL (OUTPATIENT)
Dept: OBSTETRICS AND GYNECOLOGY | Facility: CLINIC | Age: 26
End: 2021-04-13
Payer: MEDICAID

## 2021-04-13 ENCOUNTER — PATIENT MESSAGE (OUTPATIENT)
Dept: OBSTETRICS AND GYNECOLOGY | Facility: CLINIC | Age: 26
End: 2021-04-13

## 2021-04-13 VITALS
BODY MASS INDEX: 24.89 KG/M2 | SYSTOLIC BLOOD PRESSURE: 106 MMHG | DIASTOLIC BLOOD PRESSURE: 62 MMHG | WEIGHT: 123.25 LBS

## 2021-04-13 DIAGNOSIS — Z34.80 SUPERVISION OF OTHER NORMAL PREGNANCY, ANTEPARTUM: ICD-10-CM

## 2021-04-13 DIAGNOSIS — O21.9 NAUSEA/VOMITING IN PREGNANCY: ICD-10-CM

## 2021-04-13 DIAGNOSIS — R87.612 LOW GRADE SQUAMOUS INTRAEPITHELIAL LESION ON CYTOLOGIC SMEAR OF CERVIX (LGSIL): ICD-10-CM

## 2021-04-13 DIAGNOSIS — Z11.59 ENCOUNTER FOR HEPATITIS C SCREENING TEST FOR LOW RISK PATIENT: ICD-10-CM

## 2021-04-13 DIAGNOSIS — Z3A.01 LESS THAN 8 WEEKS GESTATION OF PREGNANCY: ICD-10-CM

## 2021-04-13 DIAGNOSIS — O09.291 PREGNANCY WITH POOR REPRODUCTIVE HISTORY IN FIRST TRIMESTER: Primary | ICD-10-CM

## 2021-04-13 LAB
ABO + RH BLD: NORMAL
ALBUMIN SERPL BCP-MCNC: 3.7 G/DL (ref 3.5–5.2)
ALP SERPL-CCNC: 62 U/L (ref 55–135)
ALT SERPL W/O P-5'-P-CCNC: 20 U/L (ref 10–44)
ANION GAP SERPL CALC-SCNC: 11 MMOL/L (ref 8–16)
AST SERPL-CCNC: 20 U/L (ref 10–40)
BASOPHILS # BLD AUTO: 0.02 K/UL (ref 0–0.2)
BASOPHILS NFR BLD: 0.3 % (ref 0–1.9)
BILIRUB SERPL-MCNC: 0.6 MG/DL (ref 0.1–1)
BLD GP AB SCN CELLS X3 SERPL QL: NORMAL
BUN SERPL-MCNC: 10 MG/DL (ref 6–20)
CALCIUM SERPL-MCNC: 9.7 MG/DL (ref 8.7–10.5)
CHLORIDE SERPL-SCNC: 101 MMOL/L (ref 95–110)
CO2 SERPL-SCNC: 24 MMOL/L (ref 23–29)
CREAT SERPL-MCNC: 0.6 MG/DL (ref 0.5–1.4)
DIFFERENTIAL METHOD: ABNORMAL
EOSINOPHIL # BLD AUTO: 0 K/UL (ref 0–0.5)
EOSINOPHIL NFR BLD: 0.4 % (ref 0–8)
ERYTHROCYTE [DISTWIDTH] IN BLOOD BY AUTOMATED COUNT: 13 % (ref 11.5–14.5)
EST. GFR  (AFRICAN AMERICAN): >60 ML/MIN/1.73 M^2
EST. GFR  (NON AFRICAN AMERICAN): >60 ML/MIN/1.73 M^2
GLUCOSE SERPL-MCNC: 87 MG/DL (ref 70–110)
HCT VFR BLD AUTO: 40.7 % (ref 37–48.5)
HGB BLD-MCNC: 13.5 G/DL (ref 12–16)
IMM GRANULOCYTES # BLD AUTO: 0.02 K/UL (ref 0–0.04)
IMM GRANULOCYTES NFR BLD AUTO: 0.3 % (ref 0–0.5)
LYMPHOCYTES # BLD AUTO: 1 K/UL (ref 1–4.8)
LYMPHOCYTES NFR BLD: 13.8 % (ref 18–48)
MCH RBC QN AUTO: 30.6 PG (ref 27–31)
MCHC RBC AUTO-ENTMCNC: 33.2 G/DL (ref 32–36)
MCV RBC AUTO: 92 FL (ref 82–98)
MONOCYTES # BLD AUTO: 0.4 K/UL (ref 0.3–1)
MONOCYTES NFR BLD: 5.7 % (ref 4–15)
NEUTROPHILS # BLD AUTO: 5.8 K/UL (ref 1.8–7.7)
NEUTROPHILS NFR BLD: 79.5 % (ref 38–73)
NRBC BLD-RTO: 0 /100 WBC
PLATELET # BLD AUTO: 287 K/UL (ref 150–450)
PMV BLD AUTO: 10.7 FL (ref 9.2–12.9)
POTASSIUM SERPL-SCNC: 3.5 MMOL/L (ref 3.5–5.1)
PROT SERPL-MCNC: 7.4 G/DL (ref 6–8.4)
RBC # BLD AUTO: 4.41 M/UL (ref 4–5.4)
SODIUM SERPL-SCNC: 136 MMOL/L (ref 136–145)
WBC # BLD AUTO: 7.31 K/UL (ref 3.9–12.7)

## 2021-04-13 PROCEDURE — 87340 HEPATITIS B SURFACE AG IA: CPT | Performed by: STUDENT IN AN ORGANIZED HEALTH CARE EDUCATION/TRAINING PROGRAM

## 2021-04-13 PROCEDURE — 99999 PR PBB SHADOW E&M-EST. PATIENT-LVL II: CPT | Mod: PBBFAC,,, | Performed by: OBSTETRICS & GYNECOLOGY

## 2021-04-13 PROCEDURE — 99214 OFFICE O/P EST MOD 30 MIN: CPT | Mod: TH,S$PBB,, | Performed by: OBSTETRICS & GYNECOLOGY

## 2021-04-13 PROCEDURE — 86803 HEPATITIS C AB TEST: CPT | Performed by: STUDENT IN AN ORGANIZED HEALTH CARE EDUCATION/TRAINING PROGRAM

## 2021-04-13 PROCEDURE — 86900 BLOOD TYPING SEROLOGIC ABO: CPT | Performed by: STUDENT IN AN ORGANIZED HEALTH CARE EDUCATION/TRAINING PROGRAM

## 2021-04-13 PROCEDURE — 85025 COMPLETE CBC W/AUTO DIFF WBC: CPT | Performed by: STUDENT IN AN ORGANIZED HEALTH CARE EDUCATION/TRAINING PROGRAM

## 2021-04-13 PROCEDURE — 80053 COMPREHEN METABOLIC PANEL: CPT | Performed by: STUDENT IN AN ORGANIZED HEALTH CARE EDUCATION/TRAINING PROGRAM

## 2021-04-13 PROCEDURE — 83021 HEMOGLOBIN CHROMOTOGRAPHY: CPT | Performed by: STUDENT IN AN ORGANIZED HEALTH CARE EDUCATION/TRAINING PROGRAM

## 2021-04-13 PROCEDURE — 86592 SYPHILIS TEST NON-TREP QUAL: CPT | Performed by: STUDENT IN AN ORGANIZED HEALTH CARE EDUCATION/TRAINING PROGRAM

## 2021-04-13 PROCEDURE — 99212 OFFICE O/P EST SF 10 MIN: CPT | Mod: PBBFAC,25,TH,PN | Performed by: OBSTETRICS & GYNECOLOGY

## 2021-04-13 PROCEDURE — 86787 VARICELLA-ZOSTER ANTIBODY: CPT | Performed by: STUDENT IN AN ORGANIZED HEALTH CARE EDUCATION/TRAINING PROGRAM

## 2021-04-13 PROCEDURE — 99214 PR OFFICE/OUTPT VISIT, EST, LEVL IV, 30-39 MIN: ICD-10-PCS | Mod: TH,S$PBB,, | Performed by: OBSTETRICS & GYNECOLOGY

## 2021-04-13 PROCEDURE — 99999 PR PBB SHADOW E&M-EST. PATIENT-LVL II: ICD-10-PCS | Mod: PBBFAC,,, | Performed by: OBSTETRICS & GYNECOLOGY

## 2021-04-13 PROCEDURE — 86703 HIV-1/HIV-2 1 RESULT ANTBDY: CPT | Performed by: STUDENT IN AN ORGANIZED HEALTH CARE EDUCATION/TRAINING PROGRAM

## 2021-04-13 PROCEDURE — 82570 ASSAY OF URINE CREATININE: CPT | Performed by: STUDENT IN AN ORGANIZED HEALTH CARE EDUCATION/TRAINING PROGRAM

## 2021-04-13 PROCEDURE — 86762 RUBELLA ANTIBODY: CPT | Performed by: STUDENT IN AN ORGANIZED HEALTH CARE EDUCATION/TRAINING PROGRAM

## 2021-04-13 RX ORDER — ASPIRIN 81 MG/1
81 TABLET ORAL DAILY
Qty: 30 TABLET | Refills: 8 | Status: ON HOLD | OUTPATIENT
Start: 2021-04-13 | End: 2021-10-14 | Stop reason: HOSPADM

## 2021-04-13 RX ORDER — PROMETHAZINE HYDROCHLORIDE 12.5 MG/1
12.5 TABLET ORAL EVERY 4 HOURS PRN
Qty: 30 TABLET | Refills: 2 | Status: SHIPPED | OUTPATIENT
Start: 2021-04-13 | End: 2021-08-17

## 2021-04-13 RX ORDER — PYRIDOXINE HCL (VITAMIN B6) 25 MG
25 TABLET ORAL 3 TIMES DAILY
Qty: 90 TABLET | Refills: 2 | Status: SHIPPED | OUTPATIENT
Start: 2021-04-13 | End: 2021-07-12

## 2021-04-13 RX ORDER — PROMETHAZINE HYDROCHLORIDE 12.5 MG/1
12.5 TABLET ORAL EVERY 4 HOURS PRN
Qty: 30 TABLET | Refills: 2 | Status: SHIPPED | OUTPATIENT
Start: 2021-04-13 | End: 2021-04-13 | Stop reason: SDUPTHER

## 2021-04-13 RX ORDER — PYRIDOXINE HCL (VITAMIN B6) 25 MG
25 TABLET ORAL 3 TIMES DAILY
Qty: 90 TABLET | Refills: 2 | Status: SHIPPED | OUTPATIENT
Start: 2021-04-13 | End: 2021-04-13 | Stop reason: SDUPTHER

## 2021-04-13 RX ORDER — ASPIRIN 81 MG/1
81 TABLET ORAL DAILY
Qty: 30 TABLET | Refills: 8 | Status: SHIPPED | OUTPATIENT
Start: 2021-04-13 | End: 2021-04-13 | Stop reason: SDUPTHER

## 2021-04-14 PROBLEM — O09.299 PREGNANCY WITH POOR REPRODUCTIVE HISTORY, ANTEPARTUM: Status: ACTIVE | Noted: 2021-04-13

## 2021-04-14 LAB
HBV SURFACE AG SERPL QL IA: NEGATIVE
HCV AB SERPL QL IA: NEGATIVE
HIV 1+2 AB+HIV1 P24 AG SERPL QL IA: NEGATIVE
RPR SER QL: NORMAL
RUBV IGG SER-ACNC: 20.7 IU/ML
RUBV IGG SER-IMP: REACTIVE

## 2021-04-15 ENCOUNTER — PATIENT MESSAGE (OUTPATIENT)
Dept: OBSTETRICS AND GYNECOLOGY | Facility: CLINIC | Age: 26
End: 2021-04-15

## 2021-04-15 ENCOUNTER — PATIENT MESSAGE (OUTPATIENT)
Dept: RESEARCH | Facility: HOSPITAL | Age: 26
End: 2021-04-15

## 2021-04-15 LAB
CREAT UR-MCNC: 247 MG/DL (ref 15–325)
HGB A2 MFR BLD HPLC: 2.7 % (ref 2.2–3.2)
HGB FRACT BLD ELPH-IMP: NORMAL
HGB FRACT BLD ELPH-IMP: NORMAL
PROT UR-MCNC: 25 MG/DL (ref 0–15)
PROT/CREAT UR: 0.1 MG/G{CREAT} (ref 0–0.2)
VARICELLA INTERPRETATION: NEGATIVE
VARICELLA ZOSTER IGG: 0.27 ISR (ref 0–0.9)

## 2021-04-16 PROBLEM — Z28.39 MATERNAL VARICELLA, NON-IMMUNE: Status: ACTIVE | Noted: 2021-04-16

## 2021-04-16 PROBLEM — O09.899 MATERNAL VARICELLA, NON-IMMUNE: Status: ACTIVE | Noted: 2021-04-16

## 2021-04-27 ENCOUNTER — PATIENT MESSAGE (OUTPATIENT)
Dept: ADMINISTRATIVE | Facility: OTHER | Age: 26
End: 2021-04-27

## 2021-04-29 ENCOUNTER — PROCEDURE VISIT (OUTPATIENT)
Dept: MATERNAL FETAL MEDICINE | Facility: CLINIC | Age: 26
End: 2021-04-29
Payer: MEDICAID

## 2021-04-29 ENCOUNTER — LAB VISIT (OUTPATIENT)
Dept: LAB | Facility: OTHER | Age: 26
End: 2021-04-29
Attending: OBSTETRICS & GYNECOLOGY
Payer: MEDICAID

## 2021-04-29 DIAGNOSIS — Z36.89 ENCOUNTER FOR FETAL ANATOMIC SURVEY: ICD-10-CM

## 2021-04-29 DIAGNOSIS — Z36.9 ENCOUNTER FOR ANTENATAL SCREENING: Primary | ICD-10-CM

## 2021-04-29 DIAGNOSIS — Z36.9 ENCOUNTER FOR ANTENATAL SCREENING: ICD-10-CM

## 2021-04-29 DIAGNOSIS — O09.291 PREGNANCY WITH POOR REPRODUCTIVE HISTORY IN FIRST TRIMESTER: ICD-10-CM

## 2021-04-29 PROCEDURE — 84163 PAPPA SERUM: CPT | Performed by: OBSTETRICS & GYNECOLOGY

## 2021-04-29 PROCEDURE — 76813 OB US NUCHAL MEAS 1 GEST: CPT | Mod: PBBFAC | Performed by: OBSTETRICS & GYNECOLOGY

## 2021-04-29 PROCEDURE — 76813 PR US, OB NUCHAL, TRANSABDOM/TRANSVAG, FIRST GESTATION: ICD-10-PCS | Mod: 26,S$PBB,, | Performed by: OBSTETRICS & GYNECOLOGY

## 2021-04-29 PROCEDURE — 76813 OB US NUCHAL MEAS 1 GEST: CPT | Mod: 26,S$PBB,, | Performed by: OBSTETRICS & GYNECOLOGY

## 2021-04-29 PROCEDURE — 36415 COLL VENOUS BLD VENIPUNCTURE: CPT | Performed by: OBSTETRICS & GYNECOLOGY

## 2021-05-03 ENCOUNTER — PATIENT MESSAGE (OUTPATIENT)
Dept: OBSTETRICS AND GYNECOLOGY | Facility: CLINIC | Age: 26
End: 2021-05-03

## 2021-05-03 LAB — INTEGRATED SCN PATIENT-IMP NAR: NORMAL

## 2021-05-04 ENCOUNTER — PATIENT MESSAGE (OUTPATIENT)
Dept: ADMINISTRATIVE | Facility: OTHER | Age: 26
End: 2021-05-04

## 2021-05-11 ENCOUNTER — ROUTINE PRENATAL (OUTPATIENT)
Dept: OBSTETRICS AND GYNECOLOGY | Facility: CLINIC | Age: 26
End: 2021-05-11
Payer: MEDICAID

## 2021-05-11 DIAGNOSIS — O09.899 MATERNAL VARICELLA, NON-IMMUNE: ICD-10-CM

## 2021-05-11 DIAGNOSIS — Z34.92 SUPERVISION OF LOW-RISK PREGNANCY, SECOND TRIMESTER: Primary | ICD-10-CM

## 2021-05-11 DIAGNOSIS — Z28.39 MATERNAL VARICELLA, NON-IMMUNE: ICD-10-CM

## 2021-05-11 DIAGNOSIS — O09.299 PREGNANCY WITH POOR REPRODUCTIVE HISTORY, ANTEPARTUM: ICD-10-CM

## 2021-05-11 PROCEDURE — 99212 OFFICE O/P EST SF 10 MIN: CPT | Mod: PBBFAC,TH,PN | Performed by: OBSTETRICS & GYNECOLOGY

## 2021-05-11 PROCEDURE — 99999 PR PBB SHADOW E&M-EST. PATIENT-LVL II: ICD-10-PCS | Mod: PBBFAC,,, | Performed by: OBSTETRICS & GYNECOLOGY

## 2021-05-11 PROCEDURE — 99999 PR PBB SHADOW E&M-EST. PATIENT-LVL II: CPT | Mod: PBBFAC,,, | Performed by: OBSTETRICS & GYNECOLOGY

## 2021-05-11 PROCEDURE — 99213 PR OFFICE/OUTPT VISIT, EST, LEVL III, 20-29 MIN: ICD-10-PCS | Mod: TH,S$PBB,, | Performed by: OBSTETRICS & GYNECOLOGY

## 2021-05-11 PROCEDURE — 99213 OFFICE O/P EST LOW 20 MIN: CPT | Mod: TH,S$PBB,, | Performed by: OBSTETRICS & GYNECOLOGY

## 2021-05-11 RX ORDER — ONDANSETRON 4 MG/1
4 TABLET, ORALLY DISINTEGRATING ORAL NIGHTLY
COMMUNITY
Start: 2021-04-25 | End: 2021-08-17

## 2021-05-14 ENCOUNTER — PATIENT MESSAGE (OUTPATIENT)
Dept: ADMINISTRATIVE | Facility: OTHER | Age: 26
End: 2021-05-14

## 2021-05-18 ENCOUNTER — LAB VISIT (OUTPATIENT)
Dept: LAB | Facility: OTHER | Age: 26
End: 2021-05-18
Attending: OBSTETRICS & GYNECOLOGY
Payer: MEDICAID

## 2021-05-18 DIAGNOSIS — Z34.92 SUPERVISION OF LOW-RISK PREGNANCY, SECOND TRIMESTER: ICD-10-CM

## 2021-05-18 PROCEDURE — 81511 FTL CGEN ABNOR FOUR ANAL: CPT | Performed by: OBSTETRICS & GYNECOLOGY

## 2021-05-18 PROCEDURE — 36415 COLL VENOUS BLD VENIPUNCTURE: CPT | Performed by: OBSTETRICS & GYNECOLOGY

## 2021-05-21 ENCOUNTER — PATIENT MESSAGE (OUTPATIENT)
Dept: OBSTETRICS AND GYNECOLOGY | Facility: CLINIC | Age: 26
End: 2021-05-21

## 2021-05-21 ENCOUNTER — TELEPHONE (OUTPATIENT)
Dept: OBSTETRICS AND GYNECOLOGY | Facility: CLINIC | Age: 26
End: 2021-05-21

## 2021-05-21 LAB
# FETUSES US: NORMAL
AFP MOM SERPL: 0.86
AFP SERPL-MCNC: 26.5 NG/ML
AGE AT DELIVERY: 23
B-HCG MOM SERPL: 2.13
B-HCG SERPL-ACNC: 118.4 IU/ML
COLLECT DATE BLD: NORMAL
COLLECT DATE: NORMAL
FET TS 21 RISK FROM MAT AGE: NORMAL
GA (DAYS): 2 D
GA (WEEKS): 12 WK
GA METHOD: NORMAL
GEST. AGE (DAYS) 2ND SAMPLE (SI2): 0
GEST. AGE (WKS) 2ND SAMPLE (SI2): 15
IDDM PATIENT QL: NORMAL
INHIBIN A MOM SERPL: 1.55
INHIBIN A SERPL-MCNC: 274.2 PG/ML
INTEGRATED SCN PATIENT-IMP NAR: NORMAL
INTEGRATED SCN PATIENT-IMP: NEGATIVE
PAPP-A MOM SERPL: 1.42
PAPP-A SERPL-MCNC: NORMAL NG/ML
SMOKING STATUS FTND: NO
TS 18 RISK FETUS: NORMAL
TS 21 RISK FETUS: NORMAL
U ESTRIOL MOM SERPL: 0.85
U ESTRIOL SERPL-MCNC: 0.54 NG/ML

## 2021-06-09 ENCOUNTER — ROUTINE PRENATAL (OUTPATIENT)
Dept: OBSTETRICS AND GYNECOLOGY | Facility: CLINIC | Age: 26
End: 2021-06-09
Payer: MEDICAID

## 2021-06-09 VITALS — SYSTOLIC BLOOD PRESSURE: 96 MMHG | DIASTOLIC BLOOD PRESSURE: 64 MMHG | WEIGHT: 123.44 LBS | BODY MASS INDEX: 24.94 KG/M2

## 2021-06-09 DIAGNOSIS — O09.292 PREGNANCY WITH POOR REPRODUCTIVE HISTORY IN SECOND TRIMESTER: Primary | ICD-10-CM

## 2021-06-09 DIAGNOSIS — R30.0 DYSURIA: ICD-10-CM

## 2021-06-09 PROCEDURE — 99213 PR OFFICE/OUTPT VISIT, EST, LEVL III, 20-29 MIN: ICD-10-PCS | Mod: TH,S$PBB,, | Performed by: OBSTETRICS & GYNECOLOGY

## 2021-06-09 PROCEDURE — 99213 OFFICE O/P EST LOW 20 MIN: CPT | Mod: PBBFAC,TH,PN | Performed by: OBSTETRICS & GYNECOLOGY

## 2021-06-09 PROCEDURE — 99999 PR PBB SHADOW E&M-EST. PATIENT-LVL III: CPT | Mod: PBBFAC,,, | Performed by: OBSTETRICS & GYNECOLOGY

## 2021-06-09 PROCEDURE — 87086 URINE CULTURE/COLONY COUNT: CPT | Performed by: OBSTETRICS & GYNECOLOGY

## 2021-06-09 PROCEDURE — 99213 OFFICE O/P EST LOW 20 MIN: CPT | Mod: TH,S$PBB,, | Performed by: OBSTETRICS & GYNECOLOGY

## 2021-06-09 PROCEDURE — 99999 PR PBB SHADOW E&M-EST. PATIENT-LVL III: ICD-10-PCS | Mod: PBBFAC,,, | Performed by: OBSTETRICS & GYNECOLOGY

## 2021-06-09 RX ORDER — AMOXICILLIN 500 MG/1
500 TABLET, FILM COATED ORAL 3 TIMES DAILY
COMMUNITY
Start: 2021-06-03 | End: 2021-08-17

## 2021-06-09 RX ORDER — NITROFURANTOIN 25; 75 MG/1; MG/1
100 CAPSULE ORAL 2 TIMES DAILY
Qty: 14 CAPSULE | Refills: 0 | Status: SHIPPED | OUTPATIENT
Start: 2021-06-09 | End: 2021-06-09

## 2021-06-10 ENCOUNTER — PATIENT MESSAGE (OUTPATIENT)
Dept: OBSTETRICS AND GYNECOLOGY | Facility: CLINIC | Age: 26
End: 2021-06-10

## 2021-06-10 DIAGNOSIS — N89.8 VAGINAL IRRITATION: Primary | ICD-10-CM

## 2021-06-10 LAB — BACTERIA UR CULT: NO GROWTH

## 2021-06-11 ENCOUNTER — PATIENT MESSAGE (OUTPATIENT)
Dept: OBSTETRICS AND GYNECOLOGY | Facility: CLINIC | Age: 26
End: 2021-06-11

## 2021-06-11 DIAGNOSIS — N89.8 VAGINAL IRRITATION: ICD-10-CM

## 2021-06-11 RX ORDER — TERCONAZOLE 4 MG/G
1 CREAM VAGINAL NIGHTLY
Qty: 1 TUBE | Refills: 0 | Status: SHIPPED | OUTPATIENT
Start: 2021-06-11 | End: 2021-06-18 | Stop reason: SDUPTHER

## 2021-06-15 ENCOUNTER — PROCEDURE VISIT (OUTPATIENT)
Dept: MATERNAL FETAL MEDICINE | Facility: CLINIC | Age: 26
End: 2021-06-15
Payer: MEDICAID

## 2021-06-15 DIAGNOSIS — Z36.86 ENCOUNTER FOR ANTENATAL SCREENING FOR CERVICAL LENGTH: Primary | ICD-10-CM

## 2021-06-15 DIAGNOSIS — O28.3 ECHOGENIC INTRACARDIAC FOCUS OF FETUS ON PRENATAL ULTRASOUND: ICD-10-CM

## 2021-06-15 DIAGNOSIS — Z36.89 ENCOUNTER FOR FETAL ANATOMIC SURVEY: ICD-10-CM

## 2021-06-15 PROCEDURE — 76811 OB US DETAILED SNGL FETUS: CPT | Mod: PBBFAC | Performed by: OBSTETRICS & GYNECOLOGY

## 2021-06-15 PROCEDURE — 76811 OB US DETAILED SNGL FETUS: CPT | Mod: 26,S$PBB,, | Performed by: OBSTETRICS & GYNECOLOGY

## 2021-06-15 PROCEDURE — 76811 PR US, OB FETAL EVAL & EXAM, TRANSABDOM,FIRST GESTATION: ICD-10-PCS | Mod: 26,S$PBB,, | Performed by: OBSTETRICS & GYNECOLOGY

## 2021-06-16 ENCOUNTER — PATIENT MESSAGE (OUTPATIENT)
Dept: MATERNAL FETAL MEDICINE | Facility: CLINIC | Age: 26
End: 2021-06-16

## 2021-06-18 ENCOUNTER — PATIENT MESSAGE (OUTPATIENT)
Dept: OBSTETRICS AND GYNECOLOGY | Facility: CLINIC | Age: 26
End: 2021-06-18

## 2021-06-18 RX ORDER — TERCONAZOLE 4 MG/G
1 CREAM VAGINAL NIGHTLY
Qty: 1 TUBE | Refills: 0 | Status: SHIPPED | OUTPATIENT
Start: 2021-06-18 | End: 2021-06-25

## 2021-07-07 ENCOUNTER — TELEPHONE (OUTPATIENT)
Dept: OBSTETRICS AND GYNECOLOGY | Facility: CLINIC | Age: 26
End: 2021-07-07

## 2021-07-07 ENCOUNTER — ROUTINE PRENATAL (OUTPATIENT)
Dept: OBSTETRICS AND GYNECOLOGY | Facility: CLINIC | Age: 26
End: 2021-07-07
Payer: MEDICAID

## 2021-07-07 VITALS
BODY MASS INDEX: 26.58 KG/M2 | WEIGHT: 131.63 LBS | DIASTOLIC BLOOD PRESSURE: 52 MMHG | SYSTOLIC BLOOD PRESSURE: 102 MMHG

## 2021-07-07 DIAGNOSIS — O26.872 SHORT CERVICAL LENGTH DURING PREGNANCY IN SECOND TRIMESTER: ICD-10-CM

## 2021-07-07 DIAGNOSIS — O09.292 PREGNANCY WITH POOR REPRODUCTIVE HISTORY IN SECOND TRIMESTER: Primary | ICD-10-CM

## 2021-07-07 PROCEDURE — 99999 PR PBB SHADOW E&M-EST. PATIENT-LVL III: CPT | Mod: PBBFAC,,, | Performed by: OBSTETRICS & GYNECOLOGY

## 2021-07-07 PROCEDURE — 99999 PR PBB SHADOW E&M-EST. PATIENT-LVL III: ICD-10-PCS | Mod: PBBFAC,,, | Performed by: OBSTETRICS & GYNECOLOGY

## 2021-07-07 PROCEDURE — 99213 OFFICE O/P EST LOW 20 MIN: CPT | Mod: PBBFAC,TH,PN | Performed by: OBSTETRICS & GYNECOLOGY

## 2021-07-07 PROCEDURE — 99213 PR OFFICE/OUTPT VISIT, EST, LEVL III, 20-29 MIN: ICD-10-PCS | Mod: TH,S$PBB,, | Performed by: OBSTETRICS & GYNECOLOGY

## 2021-07-07 PROCEDURE — 99213 OFFICE O/P EST LOW 20 MIN: CPT | Mod: TH,S$PBB,, | Performed by: OBSTETRICS & GYNECOLOGY

## 2021-07-08 ENCOUNTER — PROCEDURE VISIT (OUTPATIENT)
Dept: MATERNAL FETAL MEDICINE | Facility: CLINIC | Age: 26
End: 2021-07-08
Payer: MEDICAID

## 2021-07-08 DIAGNOSIS — Z36.86 ENCOUNTER FOR ANTENATAL SCREENING FOR CERVICAL LENGTH: ICD-10-CM

## 2021-07-08 DIAGNOSIS — O09.299 PREGNANCY WITH POOR REPRODUCTIVE HISTORY, ANTEPARTUM: ICD-10-CM

## 2021-07-08 PROCEDURE — 76817 PR US, OB, TRANSVAG APPROACH: ICD-10-PCS | Mod: 26,S$PBB,, | Performed by: OBSTETRICS & GYNECOLOGY

## 2021-07-08 PROCEDURE — 76817 TRANSVAGINAL US OBSTETRIC: CPT | Mod: PBBFAC | Performed by: OBSTETRICS & GYNECOLOGY

## 2021-07-08 PROCEDURE — 76817 TRANSVAGINAL US OBSTETRIC: CPT | Mod: 26,S$PBB,, | Performed by: OBSTETRICS & GYNECOLOGY

## 2021-07-12 ENCOUNTER — TELEPHONE (OUTPATIENT)
Dept: OBSTETRICS AND GYNECOLOGY | Facility: CLINIC | Age: 26
End: 2021-07-12

## 2021-07-12 ENCOUNTER — PATIENT MESSAGE (OUTPATIENT)
Dept: OBSTETRICS AND GYNECOLOGY | Facility: CLINIC | Age: 26
End: 2021-07-12

## 2021-07-19 ENCOUNTER — PATIENT MESSAGE (OUTPATIENT)
Dept: OBSTETRICS AND GYNECOLOGY | Facility: CLINIC | Age: 26
End: 2021-07-19

## 2021-07-27 ENCOUNTER — PATIENT MESSAGE (OUTPATIENT)
Dept: ADMINISTRATIVE | Facility: OTHER | Age: 26
End: 2021-07-27

## 2021-07-31 ENCOUNTER — HOSPITAL ENCOUNTER (EMERGENCY)
Facility: OTHER | Age: 26
Discharge: HOME | End: 2021-07-31
Attending: OBSTETRICS & GYNECOLOGY
Payer: MEDICAID

## 2021-07-31 VITALS
RESPIRATION RATE: 18 BRPM | DIASTOLIC BLOOD PRESSURE: 68 MMHG | HEART RATE: 111 BPM | OXYGEN SATURATION: 100 % | SYSTOLIC BLOOD PRESSURE: 112 MMHG | TEMPERATURE: 99 F

## 2021-07-31 DIAGNOSIS — U07.1 COVID-19 VIRUS DETECTED: ICD-10-CM

## 2021-07-31 DIAGNOSIS — R00.0 TACHYCARDIA: ICD-10-CM

## 2021-07-31 DIAGNOSIS — U07.1 COVID-19: Primary | ICD-10-CM

## 2021-07-31 LAB
ALBUMIN SERPL BCP-MCNC: 2.7 G/DL (ref 3.5–5.2)
ALP SERPL-CCNC: 91 U/L (ref 55–135)
ALT SERPL W/O P-5'-P-CCNC: 54 U/L (ref 10–44)
ANION GAP SERPL CALC-SCNC: 9 MMOL/L (ref 8–16)
AST SERPL-CCNC: 54 U/L (ref 10–40)
BASOPHILS # BLD AUTO: 0.02 K/UL (ref 0–0.2)
BASOPHILS NFR BLD: 0.3 % (ref 0–1.9)
BILIRUB SERPL-MCNC: 0.3 MG/DL (ref 0.1–1)
BUN SERPL-MCNC: 5 MG/DL (ref 6–20)
CALCIUM SERPL-MCNC: 8.3 MG/DL (ref 8.7–10.5)
CHLORIDE SERPL-SCNC: 104 MMOL/L (ref 95–110)
CO2 SERPL-SCNC: 20 MMOL/L (ref 23–29)
CREAT SERPL-MCNC: 0.5 MG/DL (ref 0.5–1.4)
DIFFERENTIAL METHOD: ABNORMAL
EOSINOPHIL # BLD AUTO: 0 K/UL (ref 0–0.5)
EOSINOPHIL NFR BLD: 0.3 % (ref 0–8)
ERYTHROCYTE [DISTWIDTH] IN BLOOD BY AUTOMATED COUNT: 13 % (ref 11.5–14.5)
EST. GFR  (AFRICAN AMERICAN): >60 ML/MIN/1.73 M^2
EST. GFR  (NON AFRICAN AMERICAN): >60 ML/MIN/1.73 M^2
GLUCOSE SERPL-MCNC: 96 MG/DL (ref 70–110)
HCT VFR BLD AUTO: 31.7 % (ref 37–48.5)
HGB BLD-MCNC: 10.6 G/DL (ref 12–16)
IMM GRANULOCYTES # BLD AUTO: 0.03 K/UL (ref 0–0.04)
IMM GRANULOCYTES NFR BLD AUTO: 0.4 % (ref 0–0.5)
LYMPHOCYTES # BLD AUTO: 0.6 K/UL (ref 1–4.8)
LYMPHOCYTES NFR BLD: 8 % (ref 18–48)
MCH RBC QN AUTO: 31.5 PG (ref 27–31)
MCHC RBC AUTO-ENTMCNC: 33.4 G/DL (ref 32–36)
MCV RBC AUTO: 94 FL (ref 82–98)
MONOCYTES # BLD AUTO: 0.6 K/UL (ref 0.3–1)
MONOCYTES NFR BLD: 8 % (ref 4–15)
NEUTROPHILS # BLD AUTO: 5.9 K/UL (ref 1.8–7.7)
NEUTROPHILS NFR BLD: 83 % (ref 38–73)
NRBC BLD-RTO: 0 /100 WBC
PLATELET # BLD AUTO: 281 K/UL (ref 150–450)
PMV BLD AUTO: 10.1 FL (ref 9.2–12.9)
POTASSIUM SERPL-SCNC: 3.3 MMOL/L (ref 3.5–5.1)
PROT SERPL-MCNC: 6.1 G/DL (ref 6–8.4)
RBC # BLD AUTO: 3.36 M/UL (ref 4–5.4)
SARS-COV-2 RDRP RESP QL NAA+PROBE: POSITIVE
SODIUM SERPL-SCNC: 133 MMOL/L (ref 136–145)
WBC # BLD AUTO: 7.04 K/UL (ref 3.9–12.7)

## 2021-07-31 PROCEDURE — 63600175 PHARM REV CODE 636 W HCPCS

## 2021-07-31 PROCEDURE — 85025 COMPLETE CBC W/AUTO DIFF WBC: CPT

## 2021-07-31 PROCEDURE — 80053 COMPREHEN METABOLIC PANEL: CPT

## 2021-07-31 PROCEDURE — 93010 EKG 12-LEAD: ICD-10-PCS | Mod: ,,, | Performed by: INTERNAL MEDICINE

## 2021-07-31 PROCEDURE — 99284 PR EMERGENCY DEPT VISIT,LEVEL IV: ICD-10-PCS | Mod: 25,,, | Performed by: OBSTETRICS & GYNECOLOGY

## 2021-07-31 PROCEDURE — 59025 PR FETAL 2N-STRESS TEST: ICD-10-PCS | Mod: 26,,, | Performed by: OBSTETRICS & GYNECOLOGY

## 2021-07-31 PROCEDURE — 93005 ELECTROCARDIOGRAM TRACING: CPT

## 2021-07-31 PROCEDURE — 99284 EMERGENCY DEPT VISIT MOD MDM: CPT | Mod: 25,,, | Performed by: OBSTETRICS & GYNECOLOGY

## 2021-07-31 PROCEDURE — 93010 ELECTROCARDIOGRAM REPORT: CPT | Mod: ,,, | Performed by: INTERNAL MEDICINE

## 2021-07-31 PROCEDURE — U0002 COVID-19 LAB TEST NON-CDC: HCPCS

## 2021-07-31 PROCEDURE — 25000003 PHARM REV CODE 250

## 2021-07-31 PROCEDURE — 59025 FETAL NON-STRESS TEST: CPT

## 2021-07-31 PROCEDURE — 99284 EMERGENCY DEPT VISIT MOD MDM: CPT | Mod: 25

## 2021-07-31 PROCEDURE — 59025 FETAL NON-STRESS TEST: CPT | Mod: 26,,, | Performed by: OBSTETRICS & GYNECOLOGY

## 2021-07-31 RX ORDER — ACETAMINOPHEN 325 MG/1
650 TABLET ORAL ONCE
Status: COMPLETED | OUTPATIENT
Start: 2021-07-31 | End: 2021-07-31

## 2021-07-31 RX ADMIN — ACETAMINOPHEN 650 MG: 325 TABLET, FILM COATED ORAL at 02:07

## 2021-07-31 RX ADMIN — SODIUM CHLORIDE, SODIUM LACTATE, POTASSIUM CHLORIDE, AND CALCIUM CHLORIDE 1000 ML: .6; .31; .03; .02 INJECTION, SOLUTION INTRAVENOUS at 01:07

## 2021-07-31 RX ADMIN — SODIUM CHLORIDE, SODIUM LACTATE, POTASSIUM CHLORIDE, AND CALCIUM CHLORIDE 1000 ML: .6; .31; .03; .02 INJECTION, SOLUTION INTRAVENOUS at 02:07

## 2021-08-02 ENCOUNTER — PATIENT MESSAGE (OUTPATIENT)
Dept: OBSTETRICS AND GYNECOLOGY | Facility: CLINIC | Age: 26
End: 2021-08-02

## 2021-08-02 DIAGNOSIS — O99.712 PRURITUS OF PREGNANCY IN SECOND TRIMESTER: Primary | ICD-10-CM

## 2021-08-02 DIAGNOSIS — L29.9 PRURITUS OF PREGNANCY IN SECOND TRIMESTER: Primary | ICD-10-CM

## 2021-08-13 ENCOUNTER — LAB VISIT (OUTPATIENT)
Dept: LAB | Facility: OTHER | Age: 26
End: 2021-08-13
Attending: OBSTETRICS & GYNECOLOGY
Payer: MEDICAID

## 2021-08-13 DIAGNOSIS — L29.9 PRURITUS OF PREGNANCY IN SECOND TRIMESTER: ICD-10-CM

## 2021-08-13 DIAGNOSIS — O99.712 PRURITUS OF PREGNANCY IN SECOND TRIMESTER: ICD-10-CM

## 2021-08-13 PROCEDURE — 82239 BILE ACIDS TOTAL: CPT | Performed by: OBSTETRICS & GYNECOLOGY

## 2021-08-13 PROCEDURE — 36415 COLL VENOUS BLD VENIPUNCTURE: CPT | Performed by: OBSTETRICS & GYNECOLOGY

## 2021-08-16 ENCOUNTER — PATIENT MESSAGE (OUTPATIENT)
Dept: OBSTETRICS AND GYNECOLOGY | Facility: CLINIC | Age: 26
End: 2021-08-16

## 2021-08-16 LAB — BILE AC SERPL-SCNC: 117 MCMOL/L

## 2021-08-17 ENCOUNTER — ROUTINE PRENATAL (OUTPATIENT)
Dept: OBSTETRICS AND GYNECOLOGY | Facility: CLINIC | Age: 26
End: 2021-08-17
Payer: MEDICAID

## 2021-08-17 ENCOUNTER — CLINICAL SUPPORT (OUTPATIENT)
Dept: OBSTETRICS AND GYNECOLOGY | Facility: CLINIC | Age: 26
End: 2021-08-17
Payer: MEDICAID

## 2021-08-17 ENCOUNTER — PATIENT MESSAGE (OUTPATIENT)
Dept: ADMINISTRATIVE | Facility: OTHER | Age: 26
End: 2021-08-17

## 2021-08-17 VITALS — SYSTOLIC BLOOD PRESSURE: 102 MMHG | DIASTOLIC BLOOD PRESSURE: 64 MMHG | WEIGHT: 135.13 LBS | BODY MASS INDEX: 27.3 KG/M2

## 2021-08-17 DIAGNOSIS — Z23 NEED FOR DIPHTHERIA-TETANUS-PERTUSSIS (TDAP) VACCINE: Primary | ICD-10-CM

## 2021-08-17 DIAGNOSIS — Z30.2 STERILIZATION: ICD-10-CM

## 2021-08-17 DIAGNOSIS — O09.293 PREGNANCY WITH POOR REPRODUCTIVE HISTORY IN THIRD TRIMESTER: Primary | ICD-10-CM

## 2021-08-17 DIAGNOSIS — O26.649 CHOLESTASIS DURING PREGNANCY, ANTEPARTUM: ICD-10-CM

## 2021-08-17 DIAGNOSIS — Z36.89 ENCOUNTER FOR ULTRASOUND TO ASSESS FETAL GROWTH: Primary | ICD-10-CM

## 2021-08-17 DIAGNOSIS — Z23 ENCOUNTER FOR IMMUNIZATION: ICD-10-CM

## 2021-08-17 DIAGNOSIS — O26.643 CHOLESTASIS DURING PREGNANCY IN THIRD TRIMESTER: ICD-10-CM

## 2021-08-17 LAB
ALBUMIN SERPL BCP-MCNC: 2.6 G/DL (ref 3.5–5.2)
ALP SERPL-CCNC: 178 U/L (ref 55–135)
ALT SERPL W/O P-5'-P-CCNC: 264 U/L (ref 10–44)
ANION GAP SERPL CALC-SCNC: 8 MMOL/L (ref 8–16)
AST SERPL-CCNC: 101 U/L (ref 10–40)
BILIRUB SERPL-MCNC: 0.8 MG/DL (ref 0.1–1)
BUN SERPL-MCNC: 6 MG/DL (ref 6–20)
CALCIUM SERPL-MCNC: 9 MG/DL (ref 8.7–10.5)
CHLORIDE SERPL-SCNC: 106 MMOL/L (ref 95–110)
CO2 SERPL-SCNC: 23 MMOL/L (ref 23–29)
CREAT SERPL-MCNC: 0.6 MG/DL (ref 0.5–1.4)
EST. GFR  (AFRICAN AMERICAN): >60 ML/MIN/1.73 M^2
EST. GFR  (NON AFRICAN AMERICAN): >60 ML/MIN/1.73 M^2
GLUCOSE SERPL-MCNC: 83 MG/DL (ref 70–140)
GLUCOSE SERPL-MCNC: 92 MG/DL (ref 70–110)
POTASSIUM SERPL-SCNC: 3.7 MMOL/L (ref 3.5–5.1)
PROT SERPL-MCNC: 6.6 G/DL (ref 6–8.4)
SODIUM SERPL-SCNC: 137 MMOL/L (ref 136–145)

## 2021-08-17 PROCEDURE — 99214 OFFICE O/P EST MOD 30 MIN: CPT | Mod: TH,S$PBB,, | Performed by: OBSTETRICS & GYNECOLOGY

## 2021-08-17 PROCEDURE — 99213 OFFICE O/P EST LOW 20 MIN: CPT | Mod: PBBFAC,TH,PN | Performed by: OBSTETRICS & GYNECOLOGY

## 2021-08-17 PROCEDURE — 82950 GLUCOSE TEST: CPT | Performed by: STUDENT IN AN ORGANIZED HEALTH CARE EDUCATION/TRAINING PROGRAM

## 2021-08-17 PROCEDURE — 90471 IMMUNIZATION ADMIN: CPT | Mod: PBBFAC,PN

## 2021-08-17 PROCEDURE — 80053 COMPREHEN METABOLIC PANEL: CPT | Performed by: STUDENT IN AN ORGANIZED HEALTH CARE EDUCATION/TRAINING PROGRAM

## 2021-08-17 PROCEDURE — 99999 PR PBB SHADOW E&M-EST. PATIENT-LVL III: ICD-10-PCS | Mod: PBBFAC,,, | Performed by: OBSTETRICS & GYNECOLOGY

## 2021-08-17 PROCEDURE — 99999 PR PBB SHADOW E&M-EST. PATIENT-LVL III: CPT | Mod: PBBFAC,,, | Performed by: OBSTETRICS & GYNECOLOGY

## 2021-08-17 PROCEDURE — 99214 PR OFFICE/OUTPT VISIT, EST, LEVL IV, 30-39 MIN: ICD-10-PCS | Mod: TH,S$PBB,, | Performed by: OBSTETRICS & GYNECOLOGY

## 2021-08-17 RX ORDER — URSODIOL 500 MG/1
500 TABLET, FILM COATED ORAL 2 TIMES DAILY
Qty: 60 TABLET | Refills: 3 | Status: SHIPPED | OUTPATIENT
Start: 2021-08-17 | End: 2021-08-17 | Stop reason: CLARIF

## 2021-08-17 RX ORDER — URSODIOL 300 MG/1
300 CAPSULE ORAL 2 TIMES DAILY
Qty: 60 CAPSULE | Refills: 4 | Status: SHIPPED | OUTPATIENT
Start: 2021-08-17 | End: 2021-08-18

## 2021-08-18 ENCOUNTER — PROCEDURE VISIT (OUTPATIENT)
Dept: MATERNAL FETAL MEDICINE | Facility: CLINIC | Age: 26
End: 2021-08-18
Payer: MEDICAID

## 2021-08-18 ENCOUNTER — OFFICE VISIT (OUTPATIENT)
Dept: MATERNAL FETAL MEDICINE | Facility: CLINIC | Age: 26
End: 2021-08-18
Payer: MEDICAID

## 2021-08-18 VITALS
DIASTOLIC BLOOD PRESSURE: 64 MMHG | SYSTOLIC BLOOD PRESSURE: 132 MMHG | WEIGHT: 136.44 LBS | BODY MASS INDEX: 27.51 KG/M2 | HEIGHT: 59 IN

## 2021-08-18 DIAGNOSIS — Z36.89 ENCOUNTER FOR ULTRASOUND TO CHECK FETAL GROWTH: ICD-10-CM

## 2021-08-18 DIAGNOSIS — Z36.89 ENCOUNTER FOR ULTRASOUND TO ASSESS FETAL GROWTH: ICD-10-CM

## 2021-08-18 DIAGNOSIS — O26.649 CHOLESTASIS DURING PREGNANCY, ANTEPARTUM: ICD-10-CM

## 2021-08-18 DIAGNOSIS — O26.643 CHOLESTASIS DURING PREGNANCY IN THIRD TRIMESTER: Primary | ICD-10-CM

## 2021-08-18 PROCEDURE — 99999 PR PBB SHADOW E&M-EST. PATIENT-LVL III: ICD-10-PCS | Mod: PBBFAC,,, | Performed by: OBSTETRICS & GYNECOLOGY

## 2021-08-18 PROCEDURE — 99213 OFFICE O/P EST LOW 20 MIN: CPT | Mod: PBBFAC,TH | Performed by: OBSTETRICS & GYNECOLOGY

## 2021-08-18 PROCEDURE — 76816 PR  US,PREGNANT UTERUS,F/U,TRANSABD APP: ICD-10-PCS | Mod: 26,S$PBB,, | Performed by: OBSTETRICS & GYNECOLOGY

## 2021-08-18 PROCEDURE — 99215 PR OFFICE/OUTPT VISIT, EST, LEVL V, 40-54 MIN: ICD-10-PCS | Mod: 25,S$PBB,TH, | Performed by: OBSTETRICS & GYNECOLOGY

## 2021-08-18 PROCEDURE — 76816 OB US FOLLOW-UP PER FETUS: CPT | Mod: PBBFAC | Performed by: OBSTETRICS & GYNECOLOGY

## 2021-08-18 PROCEDURE — 76816 OB US FOLLOW-UP PER FETUS: CPT | Mod: 26,S$PBB,, | Performed by: OBSTETRICS & GYNECOLOGY

## 2021-08-18 PROCEDURE — 99215 OFFICE O/P EST HI 40 MIN: CPT | Mod: 25,S$PBB,TH, | Performed by: OBSTETRICS & GYNECOLOGY

## 2021-08-18 PROCEDURE — 99999 PR PBB SHADOW E&M-EST. PATIENT-LVL III: CPT | Mod: PBBFAC,,, | Performed by: OBSTETRICS & GYNECOLOGY

## 2021-08-18 RX ORDER — URSODIOL 500 MG/1
500 TABLET, FILM COATED ORAL 2 TIMES DAILY
Status: ON HOLD | COMMUNITY
Start: 2021-08-17 | End: 2021-10-14 | Stop reason: HOSPADM

## 2021-08-19 PROBLEM — R74.01 TRANSAMINITIS: Status: ACTIVE | Noted: 2021-08-19

## 2021-08-20 ENCOUNTER — PATIENT MESSAGE (OUTPATIENT)
Dept: OBSTETRICS AND GYNECOLOGY | Facility: CLINIC | Age: 26
End: 2021-08-20

## 2021-08-24 ENCOUNTER — HOSPITAL ENCOUNTER (OUTPATIENT)
Dept: RADIOLOGY | Facility: OTHER | Age: 26
Discharge: HOME | End: 2021-08-24
Attending: OBSTETRICS & GYNECOLOGY
Payer: MEDICAID

## 2021-08-24 ENCOUNTER — HOSPITAL ENCOUNTER (OUTPATIENT)
Dept: PERINATAL CARE | Facility: OTHER | Age: 26
Discharge: HOME | End: 2021-08-24
Attending: OBSTETRICS & GYNECOLOGY
Payer: MEDICAID

## 2021-08-24 ENCOUNTER — DOCUMENTATION ONLY (OUTPATIENT)
Dept: TRANSPLANT | Facility: CLINIC | Age: 26
End: 2021-08-24

## 2021-08-24 DIAGNOSIS — O26.643 CHOLESTASIS DURING PREGNANCY IN THIRD TRIMESTER: ICD-10-CM

## 2021-08-24 PROCEDURE — 76819 PR US, OB, FETAL BIOPHYSICAL, W/O NST: ICD-10-PCS | Mod: 26,,, | Performed by: OBSTETRICS & GYNECOLOGY

## 2021-08-24 PROCEDURE — 76700 US ABDOMEN COMPLETE: ICD-10-PCS | Mod: 26,,, | Performed by: RADIOLOGY

## 2021-08-24 PROCEDURE — 76700 US EXAM ABDOM COMPLETE: CPT | Mod: TC

## 2021-08-24 PROCEDURE — 76819 FETAL BIOPHYS PROFIL W/O NST: CPT

## 2021-08-24 PROCEDURE — 76819 FETAL BIOPHYS PROFIL W/O NST: CPT | Mod: 26,,, | Performed by: OBSTETRICS & GYNECOLOGY

## 2021-08-24 PROCEDURE — 76700 US EXAM ABDOM COMPLETE: CPT | Mod: 26,,, | Performed by: RADIOLOGY

## 2021-08-26 ENCOUNTER — HOSPITAL ENCOUNTER (OUTPATIENT)
Dept: PERINATAL CARE | Facility: OTHER | Age: 26
Discharge: HOME | End: 2021-08-26
Attending: OBSTETRICS & GYNECOLOGY
Payer: MEDICAID

## 2021-08-26 ENCOUNTER — OFFICE VISIT (OUTPATIENT)
Dept: HEPATOLOGY | Facility: CLINIC | Age: 26
End: 2021-08-26
Payer: MEDICAID

## 2021-08-26 ENCOUNTER — PATIENT MESSAGE (OUTPATIENT)
Dept: HEPATOLOGY | Facility: CLINIC | Age: 26
End: 2021-08-26

## 2021-08-26 VITALS
RESPIRATION RATE: 17 BRPM | OXYGEN SATURATION: 98 % | DIASTOLIC BLOOD PRESSURE: 72 MMHG | SYSTOLIC BLOOD PRESSURE: 121 MMHG | BODY MASS INDEX: 27.8 KG/M2 | HEIGHT: 59 IN | TEMPERATURE: 98 F | HEART RATE: 88 BPM | WEIGHT: 137.88 LBS

## 2021-08-26 DIAGNOSIS — O26.643 CHOLESTASIS DURING PREGNANCY IN THIRD TRIMESTER: ICD-10-CM

## 2021-08-26 PROCEDURE — 76819 FETAL BIOPHYS PROFIL W/O NST: CPT | Mod: 26,,, | Performed by: OBSTETRICS & GYNECOLOGY

## 2021-08-26 PROCEDURE — 99999 PR PBB SHADOW E&M-EST. PATIENT-LVL V: ICD-10-PCS | Mod: PBBFAC,,, | Performed by: INTERNAL MEDICINE

## 2021-08-26 PROCEDURE — 99204 PR OFFICE/OUTPT VISIT, NEW, LEVL IV, 45-59 MIN: ICD-10-PCS | Mod: S$PBB,,, | Performed by: INTERNAL MEDICINE

## 2021-08-26 PROCEDURE — 99999 PR PBB SHADOW E&M-EST. PATIENT-LVL V: CPT | Mod: PBBFAC,,, | Performed by: INTERNAL MEDICINE

## 2021-08-26 PROCEDURE — 99215 OFFICE O/P EST HI 40 MIN: CPT | Mod: PBBFAC,25,PN | Performed by: INTERNAL MEDICINE

## 2021-08-26 PROCEDURE — 99204 OFFICE O/P NEW MOD 45 MIN: CPT | Mod: S$PBB,,, | Performed by: INTERNAL MEDICINE

## 2021-08-26 PROCEDURE — 76819 PR US, OB, FETAL BIOPHYSICAL, W/O NST: ICD-10-PCS | Mod: 26,,, | Performed by: OBSTETRICS & GYNECOLOGY

## 2021-08-26 PROCEDURE — 76819 FETAL BIOPHYS PROFIL W/O NST: CPT

## 2021-08-27 ENCOUNTER — PATIENT MESSAGE (OUTPATIENT)
Dept: OBSTETRICS AND GYNECOLOGY | Facility: CLINIC | Age: 26
End: 2021-08-27

## 2021-09-09 ENCOUNTER — PATIENT MESSAGE (OUTPATIENT)
Dept: OBSTETRICS AND GYNECOLOGY | Facility: CLINIC | Age: 26
End: 2021-09-09

## 2021-09-09 ENCOUNTER — HOSPITAL ENCOUNTER (OUTPATIENT)
Dept: PERINATAL CARE | Facility: OTHER | Age: 26
Discharge: HOME | End: 2021-09-09
Attending: OBSTETRICS & GYNECOLOGY
Payer: MEDICAID

## 2021-09-09 DIAGNOSIS — O26.643 CHOLESTASIS DURING PREGNANCY IN THIRD TRIMESTER: Primary | ICD-10-CM

## 2021-09-09 DIAGNOSIS — O26.649 CHOLESTASIS DURING PREGNANCY, ANTEPARTUM: ICD-10-CM

## 2021-09-09 PROCEDURE — 76819 FETAL BIOPHYS PROFIL W/O NST: CPT | Mod: 26,,, | Performed by: OBSTETRICS & GYNECOLOGY

## 2021-09-09 PROCEDURE — 76819 PR US, OB, FETAL BIOPHYSICAL, W/O NST: ICD-10-PCS | Mod: 26,,, | Performed by: OBSTETRICS & GYNECOLOGY

## 2021-09-13 ENCOUNTER — TELEPHONE (OUTPATIENT)
Dept: HEPATOLOGY | Facility: CLINIC | Age: 26
End: 2021-09-13

## 2021-09-14 ENCOUNTER — PATIENT MESSAGE (OUTPATIENT)
Dept: ADMINISTRATIVE | Facility: OTHER | Age: 26
End: 2021-09-14

## 2021-09-15 ENCOUNTER — OFFICE VISIT (OUTPATIENT)
Dept: MATERNAL FETAL MEDICINE | Facility: CLINIC | Age: 26
End: 2021-09-15
Payer: MEDICAID

## 2021-09-15 ENCOUNTER — ROUTINE PRENATAL (OUTPATIENT)
Dept: OBSTETRICS AND GYNECOLOGY | Facility: CLINIC | Age: 26
End: 2021-09-15
Payer: MEDICAID

## 2021-09-15 ENCOUNTER — PROCEDURE VISIT (OUTPATIENT)
Dept: MATERNAL FETAL MEDICINE | Facility: CLINIC | Age: 26
End: 2021-09-15
Payer: MEDICAID

## 2021-09-15 VITALS
DIASTOLIC BLOOD PRESSURE: 58 MMHG | WEIGHT: 139.13 LBS | SYSTOLIC BLOOD PRESSURE: 100 MMHG | BODY MASS INDEX: 28.05 KG/M2 | HEIGHT: 59 IN

## 2021-09-15 VITALS
WEIGHT: 139.75 LBS | BODY MASS INDEX: 28.23 KG/M2 | SYSTOLIC BLOOD PRESSURE: 100 MMHG | DIASTOLIC BLOOD PRESSURE: 58 MMHG

## 2021-09-15 DIAGNOSIS — O09.293 PREGNANCY WITH POOR REPRODUCTIVE HISTORY IN THIRD TRIMESTER: Primary | ICD-10-CM

## 2021-09-15 DIAGNOSIS — R30.0 DYSURIA: ICD-10-CM

## 2021-09-15 DIAGNOSIS — O09.293 HISTORY OF PRE-ECLAMPSIA IN PRIOR PREGNANCY, CURRENTLY PREGNANT IN THIRD TRIMESTER: ICD-10-CM

## 2021-09-15 DIAGNOSIS — Z36.89 ENCOUNTER FOR ULTRASOUND TO CHECK FETAL GROWTH: ICD-10-CM

## 2021-09-15 DIAGNOSIS — O26.643 CHOLESTASIS DURING PREGNANCY IN THIRD TRIMESTER: ICD-10-CM

## 2021-09-15 DIAGNOSIS — O26.649 CHOLESTASIS DURING PREGNANCY, ANTEPARTUM: ICD-10-CM

## 2021-09-15 DIAGNOSIS — R74.01 TRANSAMINITIS: ICD-10-CM

## 2021-09-15 DIAGNOSIS — O40.3XX0 POLYHYDRAMNIOS AFFECTING PREGNANCY IN THIRD TRIMESTER: ICD-10-CM

## 2021-09-15 PROCEDURE — 99999 PR PBB SHADOW E&M-EST. PATIENT-LVL II: ICD-10-PCS | Mod: PBBFAC,,, | Performed by: OBSTETRICS & GYNECOLOGY

## 2021-09-15 PROCEDURE — 99211 OFF/OP EST MAY X REQ PHY/QHP: CPT | Mod: PBBFAC,27,TH | Performed by: OBSTETRICS & GYNECOLOGY

## 2021-09-15 PROCEDURE — 76819 FETAL BIOPHYS PROFIL W/O NST: CPT | Mod: 26,S$PBB,, | Performed by: OBSTETRICS & GYNECOLOGY

## 2021-09-15 PROCEDURE — 99214 OFFICE O/P EST MOD 30 MIN: CPT | Mod: TH,S$PBB,, | Performed by: OBSTETRICS & GYNECOLOGY

## 2021-09-15 PROCEDURE — 76816 PR  US,PREGNANT UTERUS,F/U,TRANSABD APP: ICD-10-PCS | Mod: 26,S$PBB,, | Performed by: OBSTETRICS & GYNECOLOGY

## 2021-09-15 PROCEDURE — 99212 OFFICE O/P EST SF 10 MIN: CPT | Mod: PBBFAC,TH,PN | Performed by: OBSTETRICS & GYNECOLOGY

## 2021-09-15 PROCEDURE — 76816 OB US FOLLOW-UP PER FETUS: CPT | Mod: 26,S$PBB,, | Performed by: OBSTETRICS & GYNECOLOGY

## 2021-09-15 PROCEDURE — 76819 PR US, OB, FETAL BIOPHYSICAL, W/O NST: ICD-10-PCS | Mod: 26,S$PBB,, | Performed by: OBSTETRICS & GYNECOLOGY

## 2021-09-15 PROCEDURE — 99999 PR PBB SHADOW E&M-EST. PATIENT-LVL II: CPT | Mod: PBBFAC,,, | Performed by: OBSTETRICS & GYNECOLOGY

## 2021-09-15 PROCEDURE — 87086 URINE CULTURE/COLONY COUNT: CPT | Performed by: OBSTETRICS & GYNECOLOGY

## 2021-09-15 PROCEDURE — 99214 PR OFFICE/OUTPT VISIT, EST, LEVL IV, 30-39 MIN: ICD-10-PCS | Mod: S$PBB,TH,25, | Performed by: OBSTETRICS & GYNECOLOGY

## 2021-09-15 PROCEDURE — 99214 OFFICE O/P EST MOD 30 MIN: CPT | Mod: S$PBB,TH,25, | Performed by: OBSTETRICS & GYNECOLOGY

## 2021-09-15 PROCEDURE — 76816 OB US FOLLOW-UP PER FETUS: CPT | Mod: PBBFAC | Performed by: OBSTETRICS & GYNECOLOGY

## 2021-09-15 PROCEDURE — 76819 FETAL BIOPHYS PROFIL W/O NST: CPT | Mod: PBBFAC | Performed by: OBSTETRICS & GYNECOLOGY

## 2021-09-15 PROCEDURE — 99214 PR OFFICE/OUTPT VISIT, EST, LEVL IV, 30-39 MIN: ICD-10-PCS | Mod: TH,S$PBB,, | Performed by: OBSTETRICS & GYNECOLOGY

## 2021-09-15 PROCEDURE — 99999 PR PBB SHADOW E&M-EST. PATIENT-LVL I: CPT | Mod: PBBFAC,,, | Performed by: OBSTETRICS & GYNECOLOGY

## 2021-09-15 PROCEDURE — 99999 PR PBB SHADOW E&M-EST. PATIENT-LVL I: ICD-10-PCS | Mod: PBBFAC,,, | Performed by: OBSTETRICS & GYNECOLOGY

## 2021-09-16 ENCOUNTER — PATIENT MESSAGE (OUTPATIENT)
Dept: OBSTETRICS AND GYNECOLOGY | Facility: CLINIC | Age: 26
End: 2021-09-16

## 2021-09-18 LAB — BACTERIA UR CULT: NORMAL

## 2021-09-19 ENCOUNTER — PATIENT MESSAGE (OUTPATIENT)
Dept: OBSTETRICS AND GYNECOLOGY | Facility: CLINIC | Age: 26
End: 2021-09-19

## 2021-09-20 ENCOUNTER — HOSPITAL ENCOUNTER (OUTPATIENT)
Dept: PERINATAL CARE | Facility: OTHER | Age: 26
Discharge: HOME | End: 2021-09-20
Attending: OBSTETRICS & GYNECOLOGY
Payer: MEDICAID

## 2021-09-20 DIAGNOSIS — O26.643 CHOLESTASIS DURING PREGNANCY IN THIRD TRIMESTER: Primary | ICD-10-CM

## 2021-09-20 PROCEDURE — 76815 PR  US,PREGNANT UTERUS,LIMITED, 1/> FETUSES: ICD-10-PCS | Mod: 26,,, | Performed by: OBSTETRICS & GYNECOLOGY

## 2021-09-20 PROCEDURE — 76815 OB US LIMITED FETUS(S): CPT

## 2021-09-20 PROCEDURE — 59025 PR FETAL 2N-STRESS TEST: ICD-10-PCS | Mod: 26,,, | Performed by: OBSTETRICS & GYNECOLOGY

## 2021-09-20 PROCEDURE — 76815 OB US LIMITED FETUS(S): CPT | Mod: 26,,, | Performed by: OBSTETRICS & GYNECOLOGY

## 2021-09-20 PROCEDURE — 59025 FETAL NON-STRESS TEST: CPT

## 2021-09-20 PROCEDURE — 59025 FETAL NON-STRESS TEST: CPT | Mod: 26,,, | Performed by: OBSTETRICS & GYNECOLOGY

## 2021-09-23 ENCOUNTER — ROUTINE PRENATAL (OUTPATIENT)
Dept: OBSTETRICS AND GYNECOLOGY | Facility: CLINIC | Age: 26
End: 2021-09-23
Payer: MEDICAID

## 2021-09-23 ENCOUNTER — HOSPITAL ENCOUNTER (OUTPATIENT)
Dept: PERINATAL CARE | Facility: OTHER | Age: 26
Discharge: HOME | End: 2021-09-23
Attending: OBSTETRICS & GYNECOLOGY
Payer: MEDICAID

## 2021-09-23 VITALS — WEIGHT: 143.06 LBS | SYSTOLIC BLOOD PRESSURE: 100 MMHG | BODY MASS INDEX: 28.9 KG/M2 | DIASTOLIC BLOOD PRESSURE: 62 MMHG

## 2021-09-23 DIAGNOSIS — O26.643 CHOLESTASIS DURING PREGNANCY IN THIRD TRIMESTER: ICD-10-CM

## 2021-09-23 DIAGNOSIS — O09.293 PREGNANCY WITH POOR REPRODUCTIVE HISTORY IN THIRD TRIMESTER: Primary | ICD-10-CM

## 2021-09-23 DIAGNOSIS — O40.3XX0 POLYHYDRAMNIOS AFFECTING PREGNANCY IN THIRD TRIMESTER: ICD-10-CM

## 2021-09-23 DIAGNOSIS — R74.01 TRANSAMINITIS: ICD-10-CM

## 2021-09-23 PROCEDURE — 76819 FETAL BIOPHYS PROFIL W/O NST: CPT

## 2021-09-23 PROCEDURE — 99212 OFFICE O/P EST SF 10 MIN: CPT | Mod: PBBFAC,TH,PN,25 | Performed by: OBSTETRICS & GYNECOLOGY

## 2021-09-23 PROCEDURE — 76819 PR US, OB, FETAL BIOPHYSICAL, W/O NST: ICD-10-PCS | Mod: 26,,, | Performed by: OBSTETRICS & GYNECOLOGY

## 2021-09-23 PROCEDURE — 76819 FETAL BIOPHYS PROFIL W/O NST: CPT | Mod: 26,,, | Performed by: OBSTETRICS & GYNECOLOGY

## 2021-09-23 PROCEDURE — 99214 PR OFFICE/OUTPT VISIT, EST, LEVL IV, 30-39 MIN: ICD-10-PCS | Mod: TH,S$PBB,, | Performed by: OBSTETRICS & GYNECOLOGY

## 2021-09-23 PROCEDURE — 99999 PR PBB SHADOW E&M-EST. PATIENT-LVL II: ICD-10-PCS | Mod: PBBFAC,,, | Performed by: OBSTETRICS & GYNECOLOGY

## 2021-09-23 PROCEDURE — 99999 PR PBB SHADOW E&M-EST. PATIENT-LVL II: CPT | Mod: PBBFAC,,, | Performed by: OBSTETRICS & GYNECOLOGY

## 2021-09-23 PROCEDURE — 99214 OFFICE O/P EST MOD 30 MIN: CPT | Mod: TH,S$PBB,, | Performed by: OBSTETRICS & GYNECOLOGY

## 2021-09-23 PROCEDURE — 87081 CULTURE SCREEN ONLY: CPT | Performed by: OBSTETRICS & GYNECOLOGY

## 2021-09-26 LAB — BACTERIA SPEC AEROBE CULT: NORMAL

## 2021-09-27 ENCOUNTER — LAB VISIT (OUTPATIENT)
Dept: LAB | Facility: OTHER | Age: 26
End: 2021-09-27
Attending: OBSTETRICS & GYNECOLOGY
Payer: MEDICAID

## 2021-09-27 DIAGNOSIS — O09.293 PREGNANCY WITH POOR REPRODUCTIVE HISTORY IN THIRD TRIMESTER: ICD-10-CM

## 2021-09-27 DIAGNOSIS — R74.01 TRANSAMINITIS: ICD-10-CM

## 2021-09-27 DIAGNOSIS — O26.643 CHOLESTASIS DURING PREGNANCY IN THIRD TRIMESTER: ICD-10-CM

## 2021-09-27 LAB
ALBUMIN SERPL BCP-MCNC: 2.6 G/DL (ref 3.5–5.2)
ALP SERPL-CCNC: 168 U/L (ref 55–135)
ALT SERPL W/O P-5'-P-CCNC: 22 U/L (ref 10–44)
ANION GAP SERPL CALC-SCNC: 9 MMOL/L (ref 8–16)
AST SERPL-CCNC: 18 U/L (ref 10–40)
BILIRUB SERPL-MCNC: 0.3 MG/DL (ref 0.1–1)
BUN SERPL-MCNC: 7 MG/DL (ref 6–20)
CALCIUM SERPL-MCNC: 8.6 MG/DL (ref 8.7–10.5)
CHLORIDE SERPL-SCNC: 105 MMOL/L (ref 95–110)
CO2 SERPL-SCNC: 21 MMOL/L (ref 23–29)
CREAT SERPL-MCNC: 0.5 MG/DL (ref 0.5–1.4)
EST. GFR  (AFRICAN AMERICAN): >60 ML/MIN/1.73 M^2
EST. GFR  (NON AFRICAN AMERICAN): >60 ML/MIN/1.73 M^2
GLUCOSE SERPL-MCNC: 77 MG/DL (ref 70–110)
HIV 1+2 AB+HIV1 P24 AG SERPL QL IA: NEGATIVE
POTASSIUM SERPL-SCNC: 4.1 MMOL/L (ref 3.5–5.1)
PROT SERPL-MCNC: 6.5 G/DL (ref 6–8.4)
SODIUM SERPL-SCNC: 135 MMOL/L (ref 136–145)

## 2021-09-27 PROCEDURE — 80053 COMPREHEN METABOLIC PANEL: CPT | Performed by: OBSTETRICS & GYNECOLOGY

## 2021-09-27 PROCEDURE — 36415 COLL VENOUS BLD VENIPUNCTURE: CPT | Performed by: OBSTETRICS & GYNECOLOGY

## 2021-09-27 PROCEDURE — 87389 HIV-1 AG W/HIV-1&-2 AB AG IA: CPT | Performed by: OBSTETRICS & GYNECOLOGY

## 2021-09-27 PROCEDURE — 86592 SYPHILIS TEST NON-TREP QUAL: CPT | Performed by: OBSTETRICS & GYNECOLOGY

## 2021-09-28 LAB — RPR SER QL: NORMAL

## 2021-09-30 ENCOUNTER — PATIENT MESSAGE (OUTPATIENT)
Dept: OBSTETRICS AND GYNECOLOGY | Facility: CLINIC | Age: 26
End: 2021-09-30

## 2021-09-30 ENCOUNTER — HOSPITAL ENCOUNTER (OUTPATIENT)
Dept: PERINATAL CARE | Facility: OTHER | Age: 26
Discharge: HOME | End: 2021-09-30
Attending: OBSTETRICS & GYNECOLOGY
Payer: MEDICAID

## 2021-09-30 ENCOUNTER — ROUTINE PRENATAL (OUTPATIENT)
Dept: OBSTETRICS AND GYNECOLOGY | Facility: CLINIC | Age: 26
End: 2021-09-30
Payer: MEDICAID

## 2021-09-30 VITALS
SYSTOLIC BLOOD PRESSURE: 110 MMHG | WEIGHT: 145.31 LBS | BODY MASS INDEX: 29.34 KG/M2 | DIASTOLIC BLOOD PRESSURE: 60 MMHG

## 2021-09-30 DIAGNOSIS — O26.643 CHOLESTASIS DURING PREGNANCY IN THIRD TRIMESTER: Primary | ICD-10-CM

## 2021-09-30 DIAGNOSIS — O09.293 PREGNANCY WITH POOR REPRODUCTIVE HISTORY IN THIRD TRIMESTER: ICD-10-CM

## 2021-09-30 DIAGNOSIS — Z28.39 MATERNAL VARICELLA, NON-IMMUNE: ICD-10-CM

## 2021-09-30 DIAGNOSIS — R74.01 TRANSAMINITIS: ICD-10-CM

## 2021-09-30 DIAGNOSIS — O09.899 MATERNAL VARICELLA, NON-IMMUNE: ICD-10-CM

## 2021-09-30 DIAGNOSIS — O26.643 CHOLESTASIS DURING PREGNANCY IN THIRD TRIMESTER: ICD-10-CM

## 2021-09-30 DIAGNOSIS — Z30.2 STERILIZATION: ICD-10-CM

## 2021-09-30 PROCEDURE — 76819 FETAL BIOPHYS PROFIL W/O NST: CPT

## 2021-09-30 PROCEDURE — 99214 OFFICE O/P EST MOD 30 MIN: CPT | Mod: TH,S$PBB,, | Performed by: OBSTETRICS & GYNECOLOGY

## 2021-09-30 PROCEDURE — 99214 PR OFFICE/OUTPT VISIT, EST, LEVL IV, 30-39 MIN: ICD-10-PCS | Mod: TH,S$PBB,, | Performed by: OBSTETRICS & GYNECOLOGY

## 2021-09-30 PROCEDURE — 99999 PR PBB SHADOW E&M-EST. PATIENT-LVL III: CPT | Mod: PBBFAC,,, | Performed by: OBSTETRICS & GYNECOLOGY

## 2021-09-30 PROCEDURE — 76819 PR US, OB, FETAL BIOPHYSICAL, W/O NST: ICD-10-PCS | Mod: 26,,, | Performed by: OBSTETRICS & GYNECOLOGY

## 2021-09-30 PROCEDURE — 99213 OFFICE O/P EST LOW 20 MIN: CPT | Mod: PBBFAC,25,TH,PN | Performed by: OBSTETRICS & GYNECOLOGY

## 2021-09-30 PROCEDURE — 76819 FETAL BIOPHYS PROFIL W/O NST: CPT | Mod: 26,,, | Performed by: OBSTETRICS & GYNECOLOGY

## 2021-09-30 PROCEDURE — 99999 PR PBB SHADOW E&M-EST. PATIENT-LVL III: ICD-10-PCS | Mod: PBBFAC,,, | Performed by: OBSTETRICS & GYNECOLOGY

## 2021-09-30 RX ORDER — URSODIOL 300 MG/1
500 CAPSULE ORAL
COMMUNITY
End: 2021-09-30 | Stop reason: SDUPTHER

## 2021-10-04 ENCOUNTER — PATIENT MESSAGE (OUTPATIENT)
Dept: OBSTETRICS AND GYNECOLOGY | Facility: CLINIC | Age: 26
End: 2021-10-04

## 2021-10-04 DIAGNOSIS — N89.8 VAGINAL IRRITATION: Primary | ICD-10-CM

## 2021-10-04 RX ORDER — FLUCONAZOLE 150 MG/1
150 TABLET ORAL ONCE
Qty: 1 TABLET | Refills: 0 | Status: SHIPPED | OUTPATIENT
Start: 2021-10-04 | End: 2021-10-05

## 2021-10-05 ENCOUNTER — ROUTINE PRENATAL (OUTPATIENT)
Dept: OBSTETRICS AND GYNECOLOGY | Facility: CLINIC | Age: 26
End: 2021-10-05
Payer: MEDICAID

## 2021-10-05 VITALS
BODY MASS INDEX: 29.43 KG/M2 | WEIGHT: 145.75 LBS | DIASTOLIC BLOOD PRESSURE: 64 MMHG | SYSTOLIC BLOOD PRESSURE: 104 MMHG

## 2021-10-05 DIAGNOSIS — Z28.39 MATERNAL VARICELLA, NON-IMMUNE: ICD-10-CM

## 2021-10-05 DIAGNOSIS — O09.299 PREGNANCY WITH POOR REPRODUCTIVE HISTORY, ANTEPARTUM: ICD-10-CM

## 2021-10-05 DIAGNOSIS — O40.3XX0 POLYHYDRAMNIOS AFFECTING PREGNANCY IN THIRD TRIMESTER: ICD-10-CM

## 2021-10-05 DIAGNOSIS — Z30.2 STERILIZATION: ICD-10-CM

## 2021-10-05 DIAGNOSIS — O09.899 MATERNAL VARICELLA, NON-IMMUNE: ICD-10-CM

## 2021-10-05 DIAGNOSIS — R74.01 TRANSAMINITIS: ICD-10-CM

## 2021-10-05 DIAGNOSIS — O26.643 CHOLESTASIS DURING PREGNANCY IN THIRD TRIMESTER: ICD-10-CM

## 2021-10-05 PROCEDURE — 99999 PR PBB SHADOW E&M-EST. PATIENT-LVL III: CPT | Mod: PBBFAC,,, | Performed by: OBSTETRICS & GYNECOLOGY

## 2021-10-05 PROCEDURE — 99999 PR PBB SHADOW E&M-EST. PATIENT-LVL III: ICD-10-PCS | Mod: PBBFAC,,, | Performed by: OBSTETRICS & GYNECOLOGY

## 2021-10-05 PROCEDURE — 99213 OFFICE O/P EST LOW 20 MIN: CPT | Mod: PBBFAC,TH,PN | Performed by: OBSTETRICS & GYNECOLOGY

## 2021-10-05 PROCEDURE — 99213 OFFICE O/P EST LOW 20 MIN: CPT | Mod: TH,S$PBB,, | Performed by: OBSTETRICS & GYNECOLOGY

## 2021-10-05 PROCEDURE — 99213 PR OFFICE/OUTPT VISIT, EST, LEVL III, 20-29 MIN: ICD-10-PCS | Mod: TH,S$PBB,, | Performed by: OBSTETRICS & GYNECOLOGY

## 2021-10-05 RX ORDER — FLUCONAZOLE 150 MG/1
150 TABLET ORAL ONCE
Status: ON HOLD | COMMUNITY
Start: 2021-10-04 | End: 2021-10-14 | Stop reason: HOSPADM

## 2021-10-06 ENCOUNTER — PATIENT MESSAGE (OUTPATIENT)
Dept: MATERNAL FETAL MEDICINE | Facility: CLINIC | Age: 26
End: 2021-10-06

## 2021-10-07 ENCOUNTER — PROCEDURE VISIT (OUTPATIENT)
Dept: MATERNAL FETAL MEDICINE | Facility: CLINIC | Age: 26
End: 2021-10-07
Payer: MEDICAID

## 2021-10-07 ENCOUNTER — OFFICE VISIT (OUTPATIENT)
Dept: MATERNAL FETAL MEDICINE | Facility: CLINIC | Age: 26
End: 2021-10-07
Payer: MEDICAID

## 2021-10-07 VITALS
SYSTOLIC BLOOD PRESSURE: 114 MMHG | WEIGHT: 148.38 LBS | HEIGHT: 59 IN | DIASTOLIC BLOOD PRESSURE: 72 MMHG | BODY MASS INDEX: 29.91 KG/M2

## 2021-10-07 DIAGNOSIS — O26.643 CHOLESTASIS DURING PREGNANCY IN THIRD TRIMESTER: ICD-10-CM

## 2021-10-07 DIAGNOSIS — Z36.89 ENCOUNTER FOR ULTRASOUND TO CHECK FETAL GROWTH: ICD-10-CM

## 2021-10-07 PROBLEM — O40.3XX0 POLYHYDRAMNIOS AFFECTING PREGNANCY IN THIRD TRIMESTER: Status: RESOLVED | Noted: 2021-09-15 | Resolved: 2021-10-07

## 2021-10-07 PROCEDURE — 76819 FETAL BIOPHYS PROFIL W/O NST: CPT | Mod: PBBFAC | Performed by: OBSTETRICS & GYNECOLOGY

## 2021-10-07 PROCEDURE — 76816 OB US FOLLOW-UP PER FETUS: CPT | Mod: PBBFAC | Performed by: OBSTETRICS & GYNECOLOGY

## 2021-10-07 PROCEDURE — 99213 PR OFFICE/OUTPT VISIT, EST, LEVL III, 20-29 MIN: ICD-10-PCS | Mod: S$PBB,TH,25, | Performed by: OBSTETRICS & GYNECOLOGY

## 2021-10-07 PROCEDURE — 76819 FETAL BIOPHYS PROFIL W/O NST: CPT | Mod: 26,S$PBB,, | Performed by: OBSTETRICS & GYNECOLOGY

## 2021-10-07 PROCEDURE — 99999 PR PBB SHADOW E&M-EST. PATIENT-LVL III: ICD-10-PCS | Mod: PBBFAC,,, | Performed by: OBSTETRICS & GYNECOLOGY

## 2021-10-07 PROCEDURE — 76816 PR  US,PREGNANT UTERUS,F/U,TRANSABD APP: ICD-10-PCS | Mod: 26,S$PBB,, | Performed by: OBSTETRICS & GYNECOLOGY

## 2021-10-07 PROCEDURE — 76819 PR US, OB, FETAL BIOPHYSICAL, W/O NST: ICD-10-PCS | Mod: 26,S$PBB,, | Performed by: OBSTETRICS & GYNECOLOGY

## 2021-10-07 PROCEDURE — 99213 OFFICE O/P EST LOW 20 MIN: CPT | Mod: PBBFAC,TH | Performed by: OBSTETRICS & GYNECOLOGY

## 2021-10-07 PROCEDURE — 99999 PR PBB SHADOW E&M-EST. PATIENT-LVL III: CPT | Mod: PBBFAC,,, | Performed by: OBSTETRICS & GYNECOLOGY

## 2021-10-07 PROCEDURE — 76816 OB US FOLLOW-UP PER FETUS: CPT | Mod: 26,S$PBB,, | Performed by: OBSTETRICS & GYNECOLOGY

## 2021-10-07 PROCEDURE — 99213 OFFICE O/P EST LOW 20 MIN: CPT | Mod: S$PBB,TH,25, | Performed by: OBSTETRICS & GYNECOLOGY

## 2021-10-08 PROCEDURE — 99284 EMERGENCY DEPT VISIT MOD MDM: CPT | Mod: 25

## 2021-10-08 PROCEDURE — 59025 FETAL NON-STRESS TEST: CPT

## 2021-10-09 ENCOUNTER — HOSPITAL ENCOUNTER (EMERGENCY)
Facility: OTHER | Age: 26
Discharge: HOME | End: 2021-10-09
Attending: OBSTETRICS & GYNECOLOGY
Payer: MEDICAID

## 2021-10-09 VITALS
DIASTOLIC BLOOD PRESSURE: 68 MMHG | TEMPERATURE: 97 F | HEART RATE: 101 BPM | OXYGEN SATURATION: 98 % | SYSTOLIC BLOOD PRESSURE: 118 MMHG

## 2021-10-09 DIAGNOSIS — Z3A.35 35 WEEKS GESTATION OF PREGNANCY: Primary | ICD-10-CM

## 2021-10-09 DIAGNOSIS — O47.9 UTERINE CONTRACTIONS DURING PREGNANCY: ICD-10-CM

## 2021-10-09 PROCEDURE — 59025 FETAL NON-STRESS TEST: CPT | Mod: 26,,, | Performed by: OBSTETRICS & GYNECOLOGY

## 2021-10-09 PROCEDURE — 99283 PR EMERGENCY DEPT VISIT,LEVEL III: ICD-10-PCS | Mod: 25,,, | Performed by: OBSTETRICS & GYNECOLOGY

## 2021-10-09 PROCEDURE — 59025 FETAL NON-STRESS TEST: CPT

## 2021-10-09 PROCEDURE — 59025 PR FETAL 2N-STRESS TEST: ICD-10-PCS | Mod: 26,,, | Performed by: OBSTETRICS & GYNECOLOGY

## 2021-10-09 PROCEDURE — 99283 EMERGENCY DEPT VISIT LOW MDM: CPT | Mod: 25,,, | Performed by: OBSTETRICS & GYNECOLOGY

## 2021-10-09 RX ORDER — BETAMETHASONE SODIUM PHOSPHATE AND BETAMETHASONE ACETATE 3; 3 MG/ML; MG/ML
12 INJECTION, SUSPENSION INTRA-ARTICULAR; INTRALESIONAL; INTRAMUSCULAR; SOFT TISSUE
Status: DISCONTINUED | OUTPATIENT
Start: 2021-10-09 | End: 2021-10-09

## 2021-10-10 ENCOUNTER — HOSPITAL ENCOUNTER (EMERGENCY)
Facility: OTHER | Age: 26
Discharge: HOME | End: 2021-10-10
Payer: MEDICAID

## 2021-10-10 VITALS
SYSTOLIC BLOOD PRESSURE: 109 MMHG | TEMPERATURE: 98 F | RESPIRATION RATE: 18 BRPM | HEART RATE: 111 BPM | OXYGEN SATURATION: 97 % | DIASTOLIC BLOOD PRESSURE: 72 MMHG

## 2021-10-10 DIAGNOSIS — Z3A.35 35 WEEKS GESTATION OF PREGNANCY: ICD-10-CM

## 2021-10-10 DIAGNOSIS — O26.643 CHOLESTASIS DURING PREGNANCY IN THIRD TRIMESTER: Primary | ICD-10-CM

## 2021-10-10 DIAGNOSIS — R00.0 TACHYCARDIA: ICD-10-CM

## 2021-10-10 PROCEDURE — 99282 PR EMERGENCY DEPT VISIT,LEVEL II: ICD-10-PCS | Mod: 25,,, | Performed by: OBSTETRICS & GYNECOLOGY

## 2021-10-10 PROCEDURE — 59025 FETAL NON-STRESS TEST: CPT | Mod: 26,,, | Performed by: OBSTETRICS & GYNECOLOGY

## 2021-10-10 PROCEDURE — 93005 ELECTROCARDIOGRAM TRACING: CPT

## 2021-10-10 PROCEDURE — 63600175 PHARM REV CODE 636 W HCPCS: Performed by: STUDENT IN AN ORGANIZED HEALTH CARE EDUCATION/TRAINING PROGRAM

## 2021-10-10 PROCEDURE — 99282 EMERGENCY DEPT VISIT SF MDM: CPT | Mod: 25,,, | Performed by: OBSTETRICS & GYNECOLOGY

## 2021-10-10 PROCEDURE — 59025 PR FETAL 2N-STRESS TEST: ICD-10-PCS | Mod: 26,,, | Performed by: OBSTETRICS & GYNECOLOGY

## 2021-10-10 PROCEDURE — 99284 EMERGENCY DEPT VISIT MOD MDM: CPT | Mod: 25

## 2021-10-10 PROCEDURE — 96372 THER/PROPH/DIAG INJ SC/IM: CPT

## 2021-10-10 PROCEDURE — 59025 FETAL NON-STRESS TEST: CPT

## 2021-10-10 RX ORDER — BETAMETHASONE SODIUM PHOSPHATE AND BETAMETHASONE ACETATE 3; 3 MG/ML; MG/ML
12 INJECTION, SUSPENSION INTRA-ARTICULAR; INTRALESIONAL; INTRAMUSCULAR; SOFT TISSUE ONCE
Status: COMPLETED | OUTPATIENT
Start: 2021-10-10 | End: 2021-10-10

## 2021-10-10 RX ADMIN — BETAMETHASONE SODIUM PHOSPHATE AND BETAMETHASONE ACETATE 12 MG: 3; 3 INJECTION, SUSPENSION INTRA-ARTICULAR; INTRALESIONAL; INTRAMUSCULAR at 11:10

## 2021-10-11 ENCOUNTER — CLINICAL SUPPORT (OUTPATIENT)
Dept: OBSTETRICS AND GYNECOLOGY | Facility: CLINIC | Age: 26
End: 2021-10-11
Payer: MEDICAID

## 2021-10-11 DIAGNOSIS — O26.643 CHOLESTASIS DURING PREGNANCY IN THIRD TRIMESTER: ICD-10-CM

## 2021-10-11 DIAGNOSIS — O40.3XX0 POLYHYDRAMNIOS AFFECTING PREGNANCY IN THIRD TRIMESTER: Primary | ICD-10-CM

## 2021-10-11 PROCEDURE — 99999 PR PBB SHADOW E&M-EST. PATIENT-LVL I: CPT | Mod: PBBFAC,,,

## 2021-10-11 PROCEDURE — 99999 PR PBB SHADOW E&M-EST. PATIENT-LVL I: ICD-10-PCS | Mod: PBBFAC,,,

## 2021-10-11 PROCEDURE — 96372 THER/PROPH/DIAG INJ SC/IM: CPT | Mod: PBBFAC

## 2021-10-11 RX ORDER — BETAMETHASONE SODIUM PHOSPHATE AND BETAMETHASONE ACETATE 3; 3 MG/ML; MG/ML
12 INJECTION, SUSPENSION INTRA-ARTICULAR; INTRALESIONAL; INTRAMUSCULAR; SOFT TISSUE
Status: DISCONTINUED | OUTPATIENT
Start: 2021-10-11 | End: 2021-10-11 | Stop reason: HOSPADM

## 2021-10-11 RX ADMIN — BETAMETHASONE SODIUM PHOSPHATE AND BETAMETHASONE ACETATE 12 MG: 3; 3 INJECTION, SUSPENSION INTRA-ARTICULAR; INTRALESIONAL; INTRAMUSCULAR at 10:10

## 2021-10-12 ENCOUNTER — ANESTHESIA EVENT (OUTPATIENT)
Dept: OBSTETRICS AND GYNECOLOGY | Facility: OTHER | Age: 26
End: 2021-10-12
Payer: MEDICAID

## 2021-10-12 ENCOUNTER — HOSPITAL ENCOUNTER (INPATIENT)
Facility: OTHER | Age: 26
LOS: 3 days | Discharge: HOME | End: 2021-10-15
Attending: OBSTETRICS & GYNECOLOGY | Admitting: OBSTETRICS & GYNECOLOGY
Payer: MEDICAID

## 2021-10-12 ENCOUNTER — ANESTHESIA (OUTPATIENT)
Dept: OBSTETRICS AND GYNECOLOGY | Facility: OTHER | Age: 26
End: 2021-10-12
Payer: MEDICAID

## 2021-10-12 DIAGNOSIS — O26.643 CHOLESTASIS DURING PREGNANCY IN THIRD TRIMESTER: ICD-10-CM

## 2021-10-12 DIAGNOSIS — Z34.90 ENCOUNTER FOR ELECTIVE INDUCTION OF LABOR: ICD-10-CM

## 2021-10-12 LAB
ABO + RH BLD: NORMAL
ALBUMIN SERPL BCP-MCNC: 2.6 G/DL (ref 3.5–5.2)
ALP SERPL-CCNC: 184 U/L (ref 55–135)
ALT SERPL W/O P-5'-P-CCNC: 132 U/L (ref 10–44)
ANION GAP SERPL CALC-SCNC: 13 MMOL/L (ref 8–16)
AST SERPL-CCNC: 68 U/L (ref 10–40)
BASOPHILS # BLD AUTO: 0.01 K/UL (ref 0–0.2)
BASOPHILS NFR BLD: 0.1 % (ref 0–1.9)
BILIRUB SERPL-MCNC: 0.3 MG/DL (ref 0.1–1)
BILIRUB SERPL-MCNC: NEGATIVE MG/DL
BLD GP AB SCN CELLS X3 SERPL QL: NORMAL
BLOOD URINE, POC: NORMAL
BUN SERPL-MCNC: 9 MG/DL (ref 6–20)
CALCIUM SERPL-MCNC: 8.9 MG/DL (ref 8.7–10.5)
CHLORIDE SERPL-SCNC: 103 MMOL/L (ref 95–110)
CO2 SERPL-SCNC: 19 MMOL/L (ref 23–29)
COLOR, POC UA: NORMAL
CREAT SERPL-MCNC: 0.5 MG/DL (ref 0.5–1.4)
DIFFERENTIAL METHOD: ABNORMAL
EOSINOPHIL # BLD AUTO: 0 K/UL (ref 0–0.5)
EOSINOPHIL NFR BLD: 0.1 % (ref 0–8)
ERYTHROCYTE [DISTWIDTH] IN BLOOD BY AUTOMATED COUNT: 13.1 % (ref 11.5–14.5)
EST. GFR  (AFRICAN AMERICAN): >60 ML/MIN/1.73 M^2
EST. GFR  (NON AFRICAN AMERICAN): >60 ML/MIN/1.73 M^2
GLUCOSE SERPL-MCNC: 87 MG/DL (ref 70–110)
GLUCOSE UR QL STRIP: NORMAL
HCT VFR BLD AUTO: 31 % (ref 37–48.5)
HGB BLD-MCNC: 9.9 G/DL (ref 12–16)
IMM GRANULOCYTES # BLD AUTO: 0.09 K/UL (ref 0–0.04)
IMM GRANULOCYTES NFR BLD AUTO: 0.9 % (ref 0–0.5)
KETONES UR QL STRIP: NEGATIVE
LEUKOCYTE ESTERASE URINE, POC: NEGATIVE
LYMPHOCYTES # BLD AUTO: 2.2 K/UL (ref 1–4.8)
LYMPHOCYTES NFR BLD: 21.8 % (ref 18–48)
MCH RBC QN AUTO: 28 PG (ref 27–31)
MCHC RBC AUTO-ENTMCNC: 31.9 G/DL (ref 32–36)
MCV RBC AUTO: 88 FL (ref 82–98)
MONOCYTES # BLD AUTO: 0.8 K/UL (ref 0.3–1)
MONOCYTES NFR BLD: 8 % (ref 4–15)
NEUTROPHILS # BLD AUTO: 7.1 K/UL (ref 1.8–7.7)
NEUTROPHILS NFR BLD: 69.1 % (ref 38–73)
NITRITE, POC UA: NEGATIVE
NRBC BLD-RTO: 0 /100 WBC
PH, POC UA: 6
PLATELET # BLD AUTO: 356 K/UL (ref 150–450)
PMV BLD AUTO: 10.7 FL (ref 9.2–12.9)
POTASSIUM SERPL-SCNC: 3.6 MMOL/L (ref 3.5–5.1)
PROT SERPL-MCNC: 6.7 G/DL (ref 6–8.4)
PROTEIN, POC: NORMAL
RBC # BLD AUTO: 3.53 M/UL (ref 4–5.4)
SODIUM SERPL-SCNC: 135 MMOL/L (ref 136–145)
SPECIFIC GRAVITY, POC UA: 1.02
UROBILINOGEN, POC UA: NORMAL
WBC # BLD AUTO: 10.21 K/UL (ref 3.9–12.7)

## 2021-10-12 PROCEDURE — 99285 EMERGENCY DEPT VISIT HI MDM: CPT | Mod: 25

## 2021-10-12 PROCEDURE — 59400 PRA FULL ROUT OBSTE CARE,VAGINAL DELIV: ICD-10-PCS | Mod: AA,,, | Performed by: ANESTHESIOLOGY

## 2021-10-12 PROCEDURE — 81002 URINALYSIS NONAUTO W/O SCOPE: CPT

## 2021-10-12 PROCEDURE — 63600175 PHARM REV CODE 636 W HCPCS: Performed by: STUDENT IN AN ORGANIZED HEALTH CARE EDUCATION/TRAINING PROGRAM

## 2021-10-12 PROCEDURE — 25000003 PHARM REV CODE 250

## 2021-10-12 PROCEDURE — 25000003 PHARM REV CODE 250: Performed by: STUDENT IN AN ORGANIZED HEALTH CARE EDUCATION/TRAINING PROGRAM

## 2021-10-12 PROCEDURE — 85025 COMPLETE CBC W/AUTO DIFF WBC: CPT | Performed by: OBSTETRICS & GYNECOLOGY

## 2021-10-12 PROCEDURE — C1751 CATH, INF, PER/CENT/MIDLINE: HCPCS | Performed by: ANESTHESIOLOGY

## 2021-10-12 PROCEDURE — 59025 FETAL NON-STRESS TEST: CPT

## 2021-10-12 PROCEDURE — 86900 BLOOD TYPING SEROLOGIC ABO: CPT | Performed by: OBSTETRICS & GYNECOLOGY

## 2021-10-12 PROCEDURE — 59400 OBSTETRICAL CARE: CPT | Mod: AA,,, | Performed by: ANESTHESIOLOGY

## 2021-10-12 PROCEDURE — 11000001 HC ACUTE MED/SURG PRIVATE ROOM

## 2021-10-12 PROCEDURE — 80053 COMPREHEN METABOLIC PANEL: CPT | Performed by: STUDENT IN AN ORGANIZED HEALTH CARE EDUCATION/TRAINING PROGRAM

## 2021-10-12 PROCEDURE — 27200710 HC EPIDURAL INFUSION PUMP SET: Performed by: ANESTHESIOLOGY

## 2021-10-12 RX ORDER — FAMOTIDINE 10 MG/ML
20 INJECTION INTRAVENOUS ONCE
Status: DISCONTINUED | OUTPATIENT
Start: 2021-10-13 | End: 2021-10-13

## 2021-10-12 RX ORDER — TRANEXAMIC ACID 100 MG/ML
1000 INJECTION, SOLUTION INTRAVENOUS ONCE AS NEEDED
Status: DISCONTINUED | OUTPATIENT
Start: 2021-10-12 | End: 2021-10-13

## 2021-10-12 RX ORDER — OXYTOCIN/RINGER'S LACTATE 30/500 ML
334 PLASTIC BAG, INJECTION (ML) INTRAVENOUS ONCE
Status: DISCONTINUED | OUTPATIENT
Start: 2021-10-12 | End: 2021-10-13

## 2021-10-12 RX ORDER — SIMETHICONE 80 MG
1 TABLET,CHEWABLE ORAL 4 TIMES DAILY PRN
Status: DISCONTINUED | OUTPATIENT
Start: 2021-10-12 | End: 2021-10-13

## 2021-10-12 RX ORDER — OXYTOCIN/RINGER'S LACTATE 30/500 ML
95 PLASTIC BAG, INJECTION (ML) INTRAVENOUS ONCE
Status: DISCONTINUED | OUTPATIENT
Start: 2021-10-12 | End: 2021-10-13

## 2021-10-12 RX ORDER — SODIUM CITRATE AND CITRIC ACID MONOHYDRATE 334; 500 MG/5ML; MG/5ML
30 SOLUTION ORAL ONCE
Status: DISCONTINUED | OUTPATIENT
Start: 2021-10-13 | End: 2021-10-13

## 2021-10-12 RX ORDER — ONDANSETRON 8 MG/1
8 TABLET, ORALLY DISINTEGRATING ORAL EVERY 8 HOURS PRN
Status: DISCONTINUED | OUTPATIENT
Start: 2021-10-12 | End: 2021-10-13

## 2021-10-12 RX ORDER — BUPIVACAINE HYDROCHLORIDE 2.5 MG/ML
INJECTION, SOLUTION EPIDURAL; INFILTRATION; INTRACAUDAL
Status: DISPENSED
Start: 2021-10-12 | End: 2021-10-13

## 2021-10-12 RX ORDER — PROCHLORPERAZINE EDISYLATE 5 MG/ML
5 INJECTION INTRAMUSCULAR; INTRAVENOUS EVERY 6 HOURS PRN
Status: DISCONTINUED | OUTPATIENT
Start: 2021-10-12 | End: 2021-10-13

## 2021-10-12 RX ORDER — FENTANYL/BUPIVACAINE/NS/PF 2MCG/ML-.1
PLASTIC BAG, INJECTION (ML) INJECTION CONTINUOUS
Status: DISCONTINUED | OUTPATIENT
Start: 2021-10-13 | End: 2021-10-13

## 2021-10-12 RX ORDER — FENTANYL CITRATE 50 UG/ML
INJECTION, SOLUTION INTRAMUSCULAR; INTRAVENOUS
Status: COMPLETED
Start: 2021-10-12 | End: 2021-10-13

## 2021-10-12 RX ORDER — SODIUM CHLORIDE, SODIUM LACTATE, POTASSIUM CHLORIDE, CALCIUM CHLORIDE 600; 310; 30; 20 MG/100ML; MG/100ML; MG/100ML; MG/100ML
INJECTION, SOLUTION INTRAVENOUS CONTINUOUS
Status: DISCONTINUED | OUTPATIENT
Start: 2021-10-12 | End: 2021-10-13

## 2021-10-12 RX ORDER — CALCIUM CARBONATE 200(500)MG
500 TABLET,CHEWABLE ORAL 3 TIMES DAILY PRN
Status: DISCONTINUED | OUTPATIENT
Start: 2021-10-12 | End: 2021-10-13

## 2021-10-12 RX ORDER — OXYTOCIN/RINGER'S LACTATE 30/500 ML
0-30 PLASTIC BAG, INJECTION (ML) INTRAVENOUS CONTINUOUS
Status: DISCONTINUED | OUTPATIENT
Start: 2021-10-12 | End: 2021-10-13

## 2021-10-12 RX ORDER — ACETAMINOPHEN 325 MG/1
650 TABLET ORAL ONCE
Status: COMPLETED | OUTPATIENT
Start: 2021-10-12 | End: 2021-10-12

## 2021-10-12 RX ORDER — ACETAMINOPHEN 500 MG
1000 TABLET ORAL ONCE
Status: COMPLETED | OUTPATIENT
Start: 2021-10-12 | End: 2021-10-12

## 2021-10-12 RX ORDER — CEFAZOLIN SODIUM 2 G/50ML
2 SOLUTION INTRAVENOUS ONCE AS NEEDED
Status: DISCONTINUED | OUTPATIENT
Start: 2021-10-12 | End: 2021-10-13

## 2021-10-12 RX ORDER — FENTANYL/BUPIVACAINE/NS/PF 2MCG/ML-.1
PLASTIC BAG, INJECTION (ML) INJECTION
Status: COMPLETED
Start: 2021-10-12 | End: 2021-10-13

## 2021-10-12 RX ADMIN — FENTANYL CITRATE 50 MCG: 50 INJECTION, SOLUTION INTRAMUSCULAR; INTRAVENOUS at 11:10

## 2021-10-12 RX ADMIN — SODIUM CHLORIDE, SODIUM LACTATE, POTASSIUM CHLORIDE, AND CALCIUM CHLORIDE: .6; .31; .03; .02 INJECTION, SOLUTION INTRAVENOUS at 02:10

## 2021-10-12 RX ADMIN — ACETAMINOPHEN 650 MG: 325 TABLET, FILM COATED ORAL at 09:10

## 2021-10-12 RX ADMIN — Medication 10 ML/HR: at 11:10

## 2021-10-12 RX ADMIN — ACETAMINOPHEN 1000 MG: 500 TABLET ORAL at 07:10

## 2021-10-12 RX ADMIN — Medication 2 MILLI-UNITS/MIN: at 03:10

## 2021-10-12 RX ADMIN — Medication 4 ML: at 11:10

## 2021-10-13 PROCEDURE — 36004722: Performed by: OBSTETRICS & GYNECOLOGY

## 2021-10-13 PROCEDURE — 72100002 HC LABOR CARE, 1ST 8 HOURS

## 2021-10-13 PROCEDURE — 37000008 HC ANESTHESIA 1ST 15 MINUTES: Performed by: OBSTETRICS & GYNECOLOGY

## 2021-10-13 PROCEDURE — 63600175 PHARM REV CODE 636 W HCPCS: Performed by: STUDENT IN AN ORGANIZED HEALTH CARE EDUCATION/TRAINING PROGRAM

## 2021-10-13 PROCEDURE — 36004723: Performed by: OBSTETRICS & GYNECOLOGY

## 2021-10-13 PROCEDURE — 37000009 HC ANESTHESIA EA ADD 15 MINS: Mod: SZN | Performed by: OBSTETRICS & GYNECOLOGY

## 2021-10-13 PROCEDURE — 58605 PR LIGATE FALLOPIAN TUBE,POSTPARTUM: ICD-10-PCS | Mod: ,,, | Performed by: OBSTETRICS & GYNECOLOGY

## 2021-10-13 PROCEDURE — 25000003 PHARM REV CODE 250: Performed by: STUDENT IN AN ORGANIZED HEALTH CARE EDUCATION/TRAINING PROGRAM

## 2021-10-13 PROCEDURE — 51702 INSERT TEMP BLADDER CATH: CPT

## 2021-10-13 PROCEDURE — 72100003 HC LABOR CARE, EA. ADDL. 8 HRS

## 2021-10-13 PROCEDURE — 62326 NJX INTERLAMINAR LMBR/SAC: CPT | Performed by: STUDENT IN AN ORGANIZED HEALTH CARE EDUCATION/TRAINING PROGRAM

## 2021-10-13 PROCEDURE — 11000001 HC ACUTE MED/SURG PRIVATE ROOM

## 2021-10-13 PROCEDURE — 88302 TISSUE EXAM BY PATHOLOGIST: CPT | Mod: 26,,, | Performed by: PATHOLOGY

## 2021-10-13 PROCEDURE — 58605 DIVISION OF FALLOPIAN TUBE: CPT | Mod: ,,, | Performed by: OBSTETRICS & GYNECOLOGY

## 2021-10-13 PROCEDURE — 88302 PR  SURG PATH,LEVEL II: ICD-10-PCS | Mod: 26,,, | Performed by: PATHOLOGY

## 2021-10-13 PROCEDURE — 71000033 HC RECOVERY, INTIAL HOUR: Performed by: OBSTETRICS & GYNECOLOGY

## 2021-10-13 PROCEDURE — 71000039 HC RECOVERY, EACH ADD'L HOUR: Performed by: OBSTETRICS & GYNECOLOGY

## 2021-10-13 PROCEDURE — 88302 TISSUE EXAM BY PATHOLOGIST: CPT | Mod: 59 | Performed by: PATHOLOGY

## 2021-10-13 PROCEDURE — 72200005 HC VAGINAL DELIVERY LEVEL II

## 2021-10-13 RX ORDER — FENTANYL/BUPIVACAINE/NS/PF 2MCG/ML-.1
PLASTIC BAG, INJECTION (ML) INJECTION CONTINUOUS PRN
Status: DISCONTINUED | OUTPATIENT
Start: 2021-10-12 | End: 2021-10-13

## 2021-10-13 RX ORDER — PHENYLEPHRINE HCL IN 0.9% NACL 1 MG/10 ML
SYRINGE (ML) INTRAVENOUS
Status: DISCONTINUED | OUTPATIENT
Start: 2021-10-13 | End: 2021-10-13

## 2021-10-13 RX ORDER — SIMETHICONE 80 MG
1 TABLET,CHEWABLE ORAL EVERY 6 HOURS PRN
Status: DISCONTINUED | OUTPATIENT
Start: 2021-10-13 | End: 2021-10-15 | Stop reason: HOSPADM

## 2021-10-13 RX ORDER — FENTANYL CITRATE 50 UG/ML
INJECTION, SOLUTION INTRAMUSCULAR; INTRAVENOUS
Status: DISCONTINUED | OUTPATIENT
Start: 2021-10-12 | End: 2021-10-13

## 2021-10-13 RX ORDER — HYDROCODONE BITARTRATE AND ACETAMINOPHEN 5; 325 MG/1; MG/1
1 TABLET ORAL EVERY 4 HOURS PRN
Status: DISCONTINUED | OUTPATIENT
Start: 2021-10-13 | End: 2021-10-15 | Stop reason: HOSPADM

## 2021-10-13 RX ORDER — OXYTOCIN/RINGER'S LACTATE 30/500 ML
95 PLASTIC BAG, INJECTION (ML) INTRAVENOUS ONCE
Status: DISCONTINUED | OUTPATIENT
Start: 2021-10-13 | End: 2021-10-14

## 2021-10-13 RX ORDER — ACETAMINOPHEN 325 MG/1
650 TABLET ORAL EVERY 6 HOURS PRN
Status: DISCONTINUED | OUTPATIENT
Start: 2021-10-13 | End: 2021-10-15 | Stop reason: HOSPADM

## 2021-10-13 RX ORDER — DEXAMETHASONE SODIUM PHOSPHATE 4 MG/ML
INJECTION, SOLUTION INTRA-ARTICULAR; INTRALESIONAL; INTRAMUSCULAR; INTRAVENOUS; SOFT TISSUE
Status: DISCONTINUED | OUTPATIENT
Start: 2021-10-13 | End: 2021-10-13

## 2021-10-13 RX ORDER — PROCHLORPERAZINE EDISYLATE 5 MG/ML
5 INJECTION INTRAMUSCULAR; INTRAVENOUS EVERY 6 HOURS PRN
Status: DISCONTINUED | OUTPATIENT
Start: 2021-10-13 | End: 2021-10-15 | Stop reason: HOSPADM

## 2021-10-13 RX ORDER — MISOPROSTOL 200 UG/1
800 TABLET ORAL
Status: DISCONTINUED | OUTPATIENT
Start: 2021-10-13 | End: 2021-10-15 | Stop reason: HOSPADM

## 2021-10-13 RX ORDER — DOCUSATE SODIUM 100 MG/1
200 CAPSULE, LIQUID FILLED ORAL 2 TIMES DAILY PRN
Status: DISCONTINUED | OUTPATIENT
Start: 2021-10-13 | End: 2021-10-15 | Stop reason: HOSPADM

## 2021-10-13 RX ORDER — PRENATAL WITH FERROUS FUM AND FOLIC ACID 3080; 920; 120; 400; 22; 1.84; 3; 20; 10; 1; 12; 200; 27; 25; 2 [IU]/1; [IU]/1; MG/1; [IU]/1; MG/1; MG/1; MG/1; MG/1; MG/1; MG/1; UG/1; MG/1; MG/1; MG/1; MG/1
1 TABLET ORAL DAILY
Status: DISCONTINUED | OUTPATIENT
Start: 2021-10-13 | End: 2021-10-15 | Stop reason: HOSPADM

## 2021-10-13 RX ORDER — MIDAZOLAM HYDROCHLORIDE 1 MG/ML
INJECTION, SOLUTION INTRAMUSCULAR; INTRAVENOUS
Status: DISCONTINUED | OUTPATIENT
Start: 2021-10-13 | End: 2021-10-13

## 2021-10-13 RX ORDER — ONDANSETRON 8 MG/1
8 TABLET, ORALLY DISINTEGRATING ORAL EVERY 8 HOURS PRN
Status: DISCONTINUED | OUTPATIENT
Start: 2021-10-13 | End: 2021-10-15 | Stop reason: HOSPADM

## 2021-10-13 RX ORDER — BUPIVACAINE HYDROCHLORIDE 2.5 MG/ML
INJECTION, SOLUTION INFILTRATION; PERINEURAL
Status: DISCONTINUED | OUTPATIENT
Start: 2021-10-13 | End: 2021-10-13

## 2021-10-13 RX ORDER — HYDROCODONE BITARTRATE AND ACETAMINOPHEN 10; 325 MG/1; MG/1
1 TABLET ORAL EVERY 4 HOURS PRN
Status: DISCONTINUED | OUTPATIENT
Start: 2021-10-13 | End: 2021-10-15 | Stop reason: HOSPADM

## 2021-10-13 RX ORDER — DIPHENHYDRAMINE HCL 25 MG
25 CAPSULE ORAL EVERY 4 HOURS PRN
Status: DISCONTINUED | OUTPATIENT
Start: 2021-10-13 | End: 2021-10-15 | Stop reason: HOSPADM

## 2021-10-13 RX ORDER — SODIUM CHLORIDE, SODIUM LACTATE, POTASSIUM CHLORIDE, CALCIUM CHLORIDE 600; 310; 30; 20 MG/100ML; MG/100ML; MG/100ML; MG/100ML
INJECTION, SOLUTION INTRAVENOUS CONTINUOUS PRN
Status: DISCONTINUED | OUTPATIENT
Start: 2021-10-13 | End: 2021-10-13

## 2021-10-13 RX ORDER — CARBOPROST TROMETHAMINE 250 UG/ML
INJECTION, SOLUTION INTRAMUSCULAR
Status: DISCONTINUED
Start: 2021-10-13 | End: 2021-10-13 | Stop reason: WASHOUT

## 2021-10-13 RX ORDER — MISOPROSTOL 200 UG/1
TABLET ORAL
Status: DISCONTINUED
Start: 2021-10-13 | End: 2021-10-13 | Stop reason: WASHOUT

## 2021-10-13 RX ORDER — LIDOCAINE HYDROCHLORIDE AND EPINEPHRINE 15; 5 MG/ML; UG/ML
INJECTION, SOLUTION EPIDURAL
Status: DISCONTINUED | OUTPATIENT
Start: 2021-10-13 | End: 2021-10-13

## 2021-10-13 RX ORDER — ACETAMINOPHEN 10 MG/ML
INJECTION, SOLUTION INTRAVENOUS
Status: DISCONTINUED | OUTPATIENT
Start: 2021-10-13 | End: 2021-10-13

## 2021-10-13 RX ORDER — LIDOCAINE HCL/EPINEPHRINE/PF 2%-1:200K
VIAL (ML) INJECTION
Status: DISCONTINUED | OUTPATIENT
Start: 2021-10-13 | End: 2021-10-13

## 2021-10-13 RX ORDER — CHLOROPROCAINE HYDROCHLORIDE 30 MG/ML
INJECTION, SOLUTION EPIDURAL; INFILTRATION; INTRACAUDAL; PERINEURAL
Status: DISCONTINUED | OUTPATIENT
Start: 2021-10-13 | End: 2021-10-13

## 2021-10-13 RX ORDER — BUPIVACAINE HYDROCHLORIDE 2.5 MG/ML
INJECTION, SOLUTION EPIDURAL; INFILTRATION; INTRACAUDAL
Status: DISPENSED
Start: 2021-10-13 | End: 2021-10-13

## 2021-10-13 RX ORDER — CARBOPROST TROMETHAMINE 250 UG/ML
250 INJECTION, SOLUTION INTRAMUSCULAR
Status: DISCONTINUED | OUTPATIENT
Start: 2021-10-13 | End: 2021-10-15 | Stop reason: HOSPADM

## 2021-10-13 RX ORDER — ONDANSETRON 2 MG/ML
INJECTION INTRAMUSCULAR; INTRAVENOUS
Status: DISCONTINUED | OUTPATIENT
Start: 2021-10-13 | End: 2021-10-13

## 2021-10-13 RX ORDER — SODIUM CHLORIDE 0.9 % (FLUSH) 0.9 %
10 SYRINGE (ML) INJECTION
Status: DISCONTINUED | OUTPATIENT
Start: 2021-10-13 | End: 2021-10-15 | Stop reason: HOSPADM

## 2021-10-13 RX ORDER — DIPHENOXYLATE HYDROCHLORIDE AND ATROPINE SULFATE 2.5; .025 MG/1; MG/1
1 TABLET ORAL 4 TIMES DAILY PRN
Status: DISCONTINUED | OUTPATIENT
Start: 2021-10-13 | End: 2021-10-15 | Stop reason: HOSPADM

## 2021-10-13 RX ORDER — DEXMEDETOMIDINE HYDROCHLORIDE 100 UG/ML
INJECTION, SOLUTION INTRAVENOUS
Status: DISCONTINUED | OUTPATIENT
Start: 2021-10-13 | End: 2021-10-13

## 2021-10-13 RX ORDER — KETOROLAC TROMETHAMINE 30 MG/ML
INJECTION, SOLUTION INTRAMUSCULAR; INTRAVENOUS
Status: DISCONTINUED | OUTPATIENT
Start: 2021-10-13 | End: 2021-10-13

## 2021-10-13 RX ORDER — METHYLERGONOVINE MALEATE 0.2 MG/ML
200 INJECTION INTRAVENOUS
Status: DISCONTINUED | OUTPATIENT
Start: 2021-10-13 | End: 2021-10-15 | Stop reason: HOSPADM

## 2021-10-13 RX ORDER — HYDROCORTISONE 25 MG/G
CREAM TOPICAL 3 TIMES DAILY PRN
Status: DISCONTINUED | OUTPATIENT
Start: 2021-10-13 | End: 2021-10-15 | Stop reason: HOSPADM

## 2021-10-13 RX ORDER — DIPHENHYDRAMINE HYDROCHLORIDE 50 MG/ML
25 INJECTION INTRAMUSCULAR; INTRAVENOUS EVERY 4 HOURS PRN
Status: DISCONTINUED | OUTPATIENT
Start: 2021-10-13 | End: 2021-10-15 | Stop reason: HOSPADM

## 2021-10-13 RX ORDER — IBUPROFEN 600 MG/1
600 TABLET ORAL EVERY 6 HOURS
Status: DISCONTINUED | OUTPATIENT
Start: 2021-10-13 | End: 2021-10-15 | Stop reason: HOSPADM

## 2021-10-13 RX ORDER — BUPIVACAINE HYDROCHLORIDE 2.5 MG/ML
INJECTION, SOLUTION INFILTRATION; PERINEURAL CONTINUOUS PRN
Status: DISCONTINUED | OUTPATIENT
Start: 2021-10-12 | End: 2021-10-13

## 2021-10-13 RX ADMIN — CHLOROPROCAINE HYDROCHLORIDE 10 MG: 30 INJECTION, SOLUTION EPIDURAL; INFILTRATION; INTRACAUDAL; PERINEURAL at 03:10

## 2021-10-13 RX ADMIN — Medication 100 MCG: at 03:10

## 2021-10-13 RX ADMIN — LIDOCAINE HYDROCHLORIDE,EPINEPHRINE BITARTRATE 5 ML: 20; .005 INJECTION, SOLUTION EPIDURAL; INFILTRATION; INTRACAUDAL; PERINEURAL at 03:10

## 2021-10-13 RX ADMIN — FENTANYL CITRATE 100 MCG: 50 INJECTION, SOLUTION INTRAMUSCULAR; INTRAVENOUS at 03:10

## 2021-10-13 RX ADMIN — DEXMEDETOMIDINE HYDROCHLORIDE 8 MCG: 100 INJECTION, SOLUTION INTRAVENOUS at 03:10

## 2021-10-13 RX ADMIN — LIDOCAINE HYDROCHLORIDE,EPINEPHRINE BITARTRATE 10 ML: 20; .005 INJECTION, SOLUTION EPIDURAL; INFILTRATION; INTRACAUDAL; PERINEURAL at 03:10

## 2021-10-13 RX ADMIN — DEXAMETHASONE SODIUM PHOSPHATE 4 MG: 4 INJECTION, SOLUTION INTRAMUSCULAR; INTRAVENOUS at 03:10

## 2021-10-13 RX ADMIN — SODIUM CHLORIDE, SODIUM LACTATE, POTASSIUM CHLORIDE, AND CALCIUM CHLORIDE: 600; 310; 30; 20 INJECTION, SOLUTION INTRAVENOUS at 03:10

## 2021-10-13 RX ADMIN — IBUPROFEN 600 MG: 600 TABLET ORAL at 08:10

## 2021-10-13 RX ADMIN — ONDANSETRON 4 MG: 2 INJECTION INTRAMUSCULAR; INTRAVENOUS at 03:10

## 2021-10-13 RX ADMIN — HYDROCODONE BITARTRATE AND ACETAMINOPHEN 1 TABLET: 10; 325 TABLET ORAL at 06:10

## 2021-10-13 RX ADMIN — MIDAZOLAM HYDROCHLORIDE 2 MG: 1 INJECTION, SOLUTION INTRAMUSCULAR; INTRAVENOUS at 03:10

## 2021-10-13 RX ADMIN — ACETAMINOPHEN 1000 MG: 10 INJECTION INTRAVENOUS at 04:10

## 2021-10-13 RX ADMIN — BUPIVACAINE HYDROCHLORIDE 4 ML: 2.5 INJECTION, SOLUTION INFILTRATION; PERINEURAL at 11:10

## 2021-10-13 RX ADMIN — KETOROLAC TROMETHAMINE 30 MG: 30 INJECTION, SOLUTION INTRAMUSCULAR; INTRAVENOUS at 04:10

## 2021-10-13 RX ADMIN — DEXMEDETOMIDINE HYDROCHLORIDE 4 MCG: 100 INJECTION, SOLUTION INTRAVENOUS at 03:10

## 2021-10-14 LAB
BASOPHILS # BLD AUTO: 0.02 K/UL (ref 0–0.2)
BASOPHILS NFR BLD: 0.2 % (ref 0–1.9)
DIFFERENTIAL METHOD: ABNORMAL
EOSINOPHIL # BLD AUTO: 0 K/UL (ref 0–0.5)
EOSINOPHIL NFR BLD: 0.2 % (ref 0–8)
ERYTHROCYTE [DISTWIDTH] IN BLOOD BY AUTOMATED COUNT: 13.1 % (ref 11.5–14.5)
HCT VFR BLD AUTO: 31.6 % (ref 37–48.5)
HGB BLD-MCNC: 10.4 G/DL (ref 12–16)
IMM GRANULOCYTES # BLD AUTO: 0.05 K/UL (ref 0–0.04)
IMM GRANULOCYTES NFR BLD AUTO: 0.4 % (ref 0–0.5)
LYMPHOCYTES # BLD AUTO: 2.5 K/UL (ref 1–4.8)
LYMPHOCYTES NFR BLD: 22.3 % (ref 18–48)
MCH RBC QN AUTO: 30 PG (ref 27–31)
MCHC RBC AUTO-ENTMCNC: 32.9 G/DL (ref 32–36)
MCV RBC AUTO: 91 FL (ref 82–98)
MONOCYTES # BLD AUTO: 0.8 K/UL (ref 0.3–1)
MONOCYTES NFR BLD: 7.3 % (ref 4–15)
NEUTROPHILS # BLD AUTO: 7.9 K/UL (ref 1.8–7.7)
NEUTROPHILS NFR BLD: 69.6 % (ref 38–73)
NRBC BLD-RTO: 0 /100 WBC
PLATELET # BLD AUTO: 304 K/UL (ref 150–450)
PMV BLD AUTO: 10.8 FL (ref 9.2–12.9)
RBC # BLD AUTO: 3.47 M/UL (ref 4–5.4)
WBC # BLD AUTO: 11.34 K/UL (ref 3.9–12.7)

## 2021-10-14 PROCEDURE — 36415 COLL VENOUS BLD VENIPUNCTURE: CPT | Performed by: STUDENT IN AN ORGANIZED HEALTH CARE EDUCATION/TRAINING PROGRAM

## 2021-10-14 PROCEDURE — 11000001 HC ACUTE MED/SURG PRIVATE ROOM

## 2021-10-14 PROCEDURE — 85025 COMPLETE CBC W/AUTO DIFF WBC: CPT | Performed by: STUDENT IN AN ORGANIZED HEALTH CARE EDUCATION/TRAINING PROGRAM

## 2021-10-14 PROCEDURE — 99232 PR SUBSEQUENT HOSPITAL CARE,LEVL II: ICD-10-PCS | Mod: ,,, | Performed by: OBSTETRICS & GYNECOLOGY

## 2021-10-14 PROCEDURE — 99232 SBSQ HOSP IP/OBS MODERATE 35: CPT | Mod: ,,, | Performed by: OBSTETRICS & GYNECOLOGY

## 2021-10-14 PROCEDURE — 25000003 PHARM REV CODE 250: Performed by: STUDENT IN AN ORGANIZED HEALTH CARE EDUCATION/TRAINING PROGRAM

## 2021-10-14 RX ORDER — HYDROCODONE BITARTRATE AND ACETAMINOPHEN 5; 325 MG/1; MG/1
1 TABLET ORAL EVERY 4 HOURS PRN
Qty: 60 TABLET | Refills: 0 | Status: SHIPPED | OUTPATIENT
Start: 2021-10-14

## 2021-10-14 RX ORDER — IBUPROFEN 600 MG/1
600 TABLET ORAL EVERY 6 HOURS
Qty: 60 TABLET | Refills: 2 | Status: SHIPPED | OUTPATIENT
Start: 2021-10-14

## 2021-10-14 RX ORDER — DOCUSATE SODIUM 100 MG/1
200 CAPSULE, LIQUID FILLED ORAL 2 TIMES DAILY PRN
Qty: 60 CAPSULE | Refills: 2 | Status: SHIPPED | OUTPATIENT
Start: 2021-10-14

## 2021-10-14 RX ADMIN — IBUPROFEN 600 MG: 600 TABLET ORAL at 03:10

## 2021-10-14 RX ADMIN — IBUPROFEN 600 MG: 600 TABLET ORAL at 09:10

## 2021-10-14 RX ADMIN — DOCUSATE SODIUM 200 MG: 100 CAPSULE, LIQUID FILLED ORAL at 07:10

## 2021-10-14 RX ADMIN — DOCUSATE SODIUM 200 MG: 100 CAPSULE, LIQUID FILLED ORAL at 09:10

## 2021-10-14 RX ADMIN — PRENATAL VIT W/ FE FUMARATE-FA TAB 27-0.8 MG 1 TABLET: 27-0.8 TAB at 09:10

## 2021-10-14 RX ADMIN — IBUPROFEN 600 MG: 600 TABLET ORAL at 06:10

## 2021-10-15 VITALS
BODY MASS INDEX: 29.84 KG/M2 | WEIGHT: 148 LBS | OXYGEN SATURATION: 97 % | RESPIRATION RATE: 18 BRPM | HEART RATE: 91 BPM | DIASTOLIC BLOOD PRESSURE: 62 MMHG | TEMPERATURE: 98 F | SYSTOLIC BLOOD PRESSURE: 107 MMHG | HEIGHT: 59 IN

## 2021-10-15 PROCEDURE — 25000003 PHARM REV CODE 250: Performed by: STUDENT IN AN ORGANIZED HEALTH CARE EDUCATION/TRAINING PROGRAM

## 2021-10-15 PROCEDURE — 99232 PR SUBSEQUENT HOSPITAL CARE,LEVL II: ICD-10-PCS | Mod: ,,, | Performed by: OBSTETRICS & GYNECOLOGY

## 2021-10-15 PROCEDURE — 99232 SBSQ HOSP IP/OBS MODERATE 35: CPT | Mod: ,,, | Performed by: OBSTETRICS & GYNECOLOGY

## 2021-10-15 RX ADMIN — DOCUSATE SODIUM 200 MG: 100 CAPSULE, LIQUID FILLED ORAL at 08:10

## 2021-10-15 RX ADMIN — IBUPROFEN 600 MG: 600 TABLET ORAL at 05:10

## 2021-10-15 RX ADMIN — PRENATAL VIT W/ FE FUMARATE-FA TAB 27-0.8 MG 1 TABLET: 27-0.8 TAB at 08:10

## 2021-10-15 RX ADMIN — IBUPROFEN 600 MG: 600 TABLET ORAL at 12:10

## 2021-10-19 LAB
FINAL PATHOLOGIC DIAGNOSIS: NORMAL
Lab: NORMAL

## 2022-02-21 ENCOUNTER — PATIENT MESSAGE (OUTPATIENT)
Dept: OBSTETRICS AND GYNECOLOGY | Facility: CLINIC | Age: 27
End: 2022-02-21
Payer: MEDICAID

## 2022-02-22 ENCOUNTER — PATIENT MESSAGE (OUTPATIENT)
Dept: RESEARCH | Facility: HOSPITAL | Age: 27
End: 2022-02-22
Payer: MEDICAID

## 2022-03-02 ENCOUNTER — TELEPHONE (OUTPATIENT)
Dept: GENETICS | Facility: CLINIC | Age: 27
End: 2022-03-02
Payer: MEDICAID

## 2022-09-12 PROBLEM — O09.299 PREGNANCY WITH POOR REPRODUCTIVE HISTORY, ANTEPARTUM: Status: RESOLVED | Noted: 2021-04-13 | Resolved: 2022-09-12

## 2023-08-16 ENCOUNTER — OFFICE VISIT (OUTPATIENT)
Dept: URGENT CARE | Facility: CLINIC | Age: 28
End: 2023-08-16
Payer: MEDICAID

## 2023-08-16 VITALS
OXYGEN SATURATION: 98 % | SYSTOLIC BLOOD PRESSURE: 112 MMHG | DIASTOLIC BLOOD PRESSURE: 77 MMHG | BODY MASS INDEX: 29.84 KG/M2 | HEIGHT: 59 IN | TEMPERATURE: 98 F | HEART RATE: 72 BPM | RESPIRATION RATE: 20 BRPM | WEIGHT: 148 LBS

## 2023-08-16 DIAGNOSIS — R05.9 COUGH, UNSPECIFIED TYPE: Primary | ICD-10-CM

## 2023-08-16 DIAGNOSIS — U07.1 COVID: ICD-10-CM

## 2023-08-16 DIAGNOSIS — R51.9 NONINTRACTABLE HEADACHE, UNSPECIFIED CHRONICITY PATTERN, UNSPECIFIED HEADACHE TYPE: ICD-10-CM

## 2023-08-16 LAB
CTP QC/QA: YES
SARS-COV-2 AG RESP QL IA.RAPID: POSITIVE

## 2023-08-16 PROCEDURE — 99203 PR OFFICE/OUTPT VISIT, NEW, LEVL III, 30-44 MIN: ICD-10-PCS | Mod: S$GLB,,, | Performed by: FAMILY MEDICINE

## 2023-08-16 PROCEDURE — 87811 SARS-COV-2 COVID19 W/OPTIC: CPT | Mod: QW,S$GLB,, | Performed by: FAMILY MEDICINE

## 2023-08-16 PROCEDURE — 87811 SARS CORONAVIRUS 2 ANTIGEN POCT, MANUAL READ: ICD-10-PCS | Mod: QW,S$GLB,, | Performed by: FAMILY MEDICINE

## 2023-08-16 PROCEDURE — 99203 OFFICE O/P NEW LOW 30 MIN: CPT | Mod: S$GLB,,, | Performed by: FAMILY MEDICINE

## 2023-08-16 RX ORDER — BENZONATATE 200 MG/1
200 CAPSULE ORAL 3 TIMES DAILY PRN
Qty: 30 CAPSULE | Refills: 0 | Status: SHIPPED | OUTPATIENT
Start: 2023-08-16 | End: 2023-08-26

## 2023-08-16 RX ORDER — ESCITALOPRAM OXALATE 10 MG/1
10 TABLET ORAL
COMMUNITY
Start: 2023-07-30

## 2023-08-16 RX ORDER — IPRATROPIUM BROMIDE 21 UG/1
2 SPRAY, METERED NASAL 2 TIMES DAILY PRN
Qty: 30 ML | Refills: 0 | Status: SHIPPED | OUTPATIENT
Start: 2023-08-16

## 2023-08-16 RX ORDER — BUTALBITAL, ACETAMINOPHEN AND CAFFEINE 50; 325; 40 MG/1; MG/1; MG/1
1 TABLET ORAL 3 TIMES DAILY PRN
Qty: 21 TABLET | Refills: 0 | Status: SHIPPED | OUTPATIENT
Start: 2023-08-16 | End: 2023-09-15

## 2023-08-16 NOTE — PROGRESS NOTES
Per Betina Ventura. Patient just over 30day PreOp H&P for Cataract Surgery.     Since H&P visit, Patient has broken Her Left Wrist-Cast applied Subjective:      Patient ID: Mikie Quigley is a 27 y.o. female.    Vitals:  vitals were not taken for this visit.     Chief Complaint: No chief complaint on file.    Pt is complaining of congestion, cough, and runny nose that started Friday. She said she is a little achy but started her period so it could be from that.     URI   This is a new problem. The current episode started in the past 7 days. There has been no fever. Associated symptoms include congestion, coughing, headaches, rhinorrhea, sinus pain and sneezing. Pertinent negatives include no diarrhea, joint pain, joint swelling, nausea, neck pain, plugged ear sensation, rash, sore throat, swollen glands, vomiting or wheezing. She has tried acetaminophen, decongestant and antihistamine for the symptoms. The treatment provided no relief.     HENT:  Positive for congestion and sinus pain. Negative for sore throat.    Neck: Negative for neck pain.   Respiratory:  Positive for cough. Negative for wheezing.    Gastrointestinal:  Negative for nausea, vomiting and diarrhea.   Skin:  Negative for rash.   Allergic/Immunologic: Positive for sneezing.   Neurological:  Positive for headaches.    Objective:     Physical Exam   Constitutional: She is oriented to person, place, and time. normal  HENT:   Head: Normocephalic and atraumatic.   Ears:   Right Ear: Tympanic membrane, external ear and ear canal normal.   Left Ear: Tympanic membrane, external ear and ear canal normal.   Nose: Rhinorrhea and congestion present.   Mouth/Throat: Mucous membranes are moist. Posterior oropharyngeal erythema present. Oropharynx is clear.   Eyes: Conjunctivae are normal. Pupils are equal, round, and reactive to light. Extraocular movement intact   Neck: Neck supple.   Cardiovascular: Normal rate, regular rhythm, normal heart sounds and normal pulses.   No murmur heard.  Pulmonary/Chest: Effort normal and breath sounds normal. No respiratory distress.   Abdominal: Normal appearance  and bowel sounds are normal. Soft. flat abdomen   Musculoskeletal: Normal range of motion.         General: No swelling or tenderness. Normal range of motion.   Neurological: no focal deficit. She is alert, oriented to person, place, and time and at baseline.   Skin: Skin is warm, dry and not pale. Capillary refill takes less than 2 seconds. No bruising jaundice  Psychiatric: Her behavior is normal. Mood, judgment and thought content normal.   Nursing note and vitals reviewed.    Assessment:     Plan:   1. Cough, unspecified type  - SARS Coronavirus 2 Antigen, POCT Manual Read  - benzonatate (TESSALON) 200 MG capsule; Take 1 capsule (200 mg total) by mouth 3 (three) times daily as needed for Cough.  Dispense: 30 capsule; Refill: 0    2. COVID  - ipratropium (ATROVENT) 21 mcg (0.03 %) nasal spray; 2 sprays by Each Nostril route 2 (two) times daily as needed.  Dispense: 30 mL; Refill: 0    3. Nonintractable headache, unspecified chronicity pattern, unspecified headache type  - butalbital-acetaminophen-caffeine -40 mg (FIORICET, ESGIC) -40 mg per tablet; Take 1 tablet by mouth 3 (three) times daily as needed for Pain.  Dispense: 21 tablet; Refill: 0   All results discussed with patient prior to discharge from clinic.

## 2025-07-30 ENCOUNTER — OFFICE VISIT (OUTPATIENT)
Dept: OBSTETRICS AND GYNECOLOGY | Facility: CLINIC | Age: 30
End: 2025-07-30
Payer: MEDICAID

## 2025-07-30 VITALS
WEIGHT: 158.5 LBS | SYSTOLIC BLOOD PRESSURE: 126 MMHG | DIASTOLIC BLOOD PRESSURE: 90 MMHG | HEART RATE: 96 BPM | BODY MASS INDEX: 32.02 KG/M2

## 2025-07-30 DIAGNOSIS — N39.3 SUI (STRESS URINARY INCONTINENCE, FEMALE): ICD-10-CM

## 2025-07-30 DIAGNOSIS — Z01.419 WELL WOMAN EXAM WITH ROUTINE GYNECOLOGICAL EXAM: Primary | ICD-10-CM

## 2025-07-30 DIAGNOSIS — L30.4 INTERTRIGO: ICD-10-CM

## 2025-07-30 DIAGNOSIS — Z12.4 ENCOUNTER FOR SCREENING FOR CERVICAL CANCER: ICD-10-CM

## 2025-07-30 DIAGNOSIS — M62.89 PELVIC FLOOR DYSFUNCTION: ICD-10-CM

## 2025-07-30 DIAGNOSIS — Z11.3 SCREENING EXAMINATION FOR STD (SEXUALLY TRANSMITTED DISEASE): ICD-10-CM

## 2025-07-30 PROCEDURE — 99999 PR PBB SHADOW E&M-EST. PATIENT-LVL III: CPT | Mod: PBBFAC,,,

## 2025-07-30 PROCEDURE — 99213 OFFICE O/P EST LOW 20 MIN: CPT | Mod: PBBFAC,PO

## 2025-07-30 RX ORDER — CLOTRIMAZOLE AND BETAMETHASONE DIPROPIONATE 10; .64 MG/G; MG/G
CREAM TOPICAL 2 TIMES DAILY
Qty: 45 G | Refills: 0 | Status: SHIPPED | OUTPATIENT
Start: 2025-07-30

## 2025-07-30 RX ORDER — SPIRONOLACTONE 100 MG/1
100 TABLET, FILM COATED ORAL DAILY
COMMUNITY

## 2025-07-30 RX ORDER — CLOTRIMAZOLE AND BETAMETHASONE DIPROPIONATE 10; .64 MG/G; MG/G
CREAM TOPICAL 2 TIMES DAILY
Qty: 45 G | Refills: 0 | Status: SHIPPED | OUTPATIENT
Start: 2025-07-30 | End: 2025-07-30

## 2025-07-30 NOTE — PROGRESS NOTES
SUBJECTIVE:   29 y.o. female  presents for annual well woman exam.   Patient's last menstrual period was 2025 (exact date).. Age of first menarche: 17. Periods are regular, heavy, lasting 7 days. Reports heavy periods since tubal ligation.  Is currently sexually active with one male. Tubal ligation in  for contraception. She is interested in possible tubal ligation reversal. Would like STD testing without blood work.  Denies abnormal bleeding, vaginal pain, discharge, itching, irritation, or odor.   Denies any breast or bowel complaints. Reports some stress urinary incontinence-with laughing, coughing, sneezing. Denies any dysuria, hematuria, fever, chills, flank pain.  She is taking spironolactone 100 mg for acne.     Denies domestic violence. She works in payroll.       Pap 2021-LSIL, HPV+ for other risk types  -History of abnormal pap-yes           History reviewed. No pertinent past medical history.  Past Surgical History:   Procedure Laterality Date    CHOLECYSTECTOMY   gallstones    LIPOSUCTION      POSTPARTUM LIGATION OF FALLOPIAN TUBE Bilateral 10/13/2021    Procedure: LIGATION, FALLOPIAN TUBE, POSTPARTUM;  Surgeon: Deborah Aguila MD;  Location: [facility] L&D;  Service: OB/GYN;  Laterality: Bilateral;    TUBAL LIGATION       Social History[1]  Family History   Problem Relation Name Age of Onset    Colon cancer Maternal Grandmother      No Known Problems Father      No Known Problems Mother      Breast cancer Neg Hx      Ovarian cancer Neg Hx       OB History    Para Term  AB Living   3 3 1 2 0 3   SAB IAB Ectopic Multiple Live Births   0 0 0 0 3      # Outcome Date GA Lbr Mauricio/2nd Weight Sex Type Anes PTL Lv   3  10/13/21 36w1d / 01:43 2.94 kg (6 lb 7.7 oz) M Vag-Spont EPI Y NELLY      Complications: Cholestasis during pregnancy   2 Term 18 39w1d  2.841 kg (6 lb 4.2 oz) F Vag-Spont Spinal, EPI N NELLY   1  16 34w0d  2.438 kg (5 lb 6 oz) F  Vag-Spont EPI, Spinal N NELLY      Complications: Preeclampsia         Current Medications[2]  Allergies: Patient has no known allergies.     ROS:  Constitutional: no weight loss, weight gain, fever, fatigue  Eyes:  No vision changes, glasses/contacts  ENT/Mouth: No ulcers, sinus problems, ears ringing, headache  Cardiovascular: No inability to lie flat, chest pain, exercise intolerance, swelling, heart palpitations  Respiratory: No wheezing, coughing blood, shortness of breath, or cough  Gastrointestinal: No diarrhea, bloody stool, nausea/vomiting, constipation, gas, hemorrhoids  Genitourinary: See HPI  Musculoskeletal: No muscle weakness  Skin/Breast: No painful breasts, nipple discharge, masses, rash, ulcers  Neurological: No passing out, seizures, numbness, headache  Endocrine: No hot flashes, hair loss, abnormal hair growth, ance  Psychiatric: No depression, crying  Hematologic: No bruises, bleeding, swollen lymph nodes, anemia.      OBJECTIVE:   The patient appears well, alert, oriented x 3, in no distress.  BP (!) 126/90 (Patient Position: Sitting)   Pulse 96   Wt 71.9 kg (158 lb 8.2 oz)   LMP 06/28/2025 (Exact Date)   BMI 32.02 kg/m²   NECK: no thyromegaly, trachea midline  SKIN: no acne, striae, hirsutism  BREAST EXAM: breasts appear normal, no suspicious masses, no skin or nipple changes or axillary nodes. Wet erythematous areas under bilateral breasts  ABDOMEN: no hernias, masses, or hepatosplenomegaly  GENITALIA: normal external genitalia, no erythema, no discharge  URETHRA: normal urethra, normal urethral meatus  VAGINA: Normal  CERVIX: no lesions or cervical motion tenderness  UTERUS: normal  ADNEXA: no mass, fullness, tenderness  There is severe pelvic floor muscle tenderness      ASSESSMENT:   Diagnoses and all orders for this visit:    Well woman exam with routine gynecological exam  -     Liquid-Based Pap Smear, Screening    Encounter for screening for cervical cancer  -     Liquid-Based Pap  Smear, Screening    Screening examination for STD (sexually transmitted disease)  -     C. trachomatis/N. gonorrhoeae by AMP DNA    NEVA (stress urinary incontinence, female)  -     Ambulatory Referral/Consult to Physical Therapy; Future    Intertrigo  -     Discontinue: clotrimazole-betamethasone 1-0.05% (LOTRISONE) cream; Apply topically 2 (two) times a day. Apply to vulva for 7-10 days then use prn.  -     clotrimazole-betamethasone 1-0.05% (LOTRISONE) cream; Apply topically 2 (two) times a day.    Pelvic floor dysfunction        Orders Placed This Encounter   Procedures    C. trachomatis/N. gonorrhoeae by AMP DNA    Ambulatory Referral/Consult to Physical Therapy     Discussed pelvic floor muscle exercises-she will do at home but is interested in PT.     Follow up in 1 year for annual exam or as needed.  Elle Armas PA-C         [1]   Social History  Socioeconomic History    Marital status: Single   Tobacco Use    Smoking status: Never    Smokeless tobacco: Never   Substance and Sexual Activity    Alcohol use: Yes     Comment: socially    Drug use: No    Sexual activity: Yes     Partners: Male     Birth control/protection: See Surgical Hx     Social Drivers of Health     Financial Resource Strain: Low Risk  (11/7/2023)    Received from [facility]    Overall Financial Resource Strain (CARDIA)     Difficulty of Paying Living Expenses: Not very hard   Food Insecurity: No Food Insecurity (11/7/2023)    Received from [facility]    Hunger Vital Sign     Worried About Running Out of Food in the Last Year: Never true     Ran Out of Food in the Last Year: Never true   Transportation Needs: No Transportation Needs (11/7/2023)    Received from [facility]    PRAPARE - Transportation     Lack of Transportation (Medical): No     Lack of Transportation (Non-Medical): No   Physical Activity: Insufficiently Active (11/7/2023)    Received from [facility]    Exercise Vital Sign     Days of Exercise per Week: 1 day      Minutes of Exercise per Session: 20 min   Stress: No Stress Concern Present (11/7/2023)    Received from [facility]    [employer] [place] - Occupational Stress Questionnaire     Feeling of Stress : Not at all   Housing Stability: Low Risk  (11/7/2023)    Received from [facility]    Housing Stability Vital Sign     Unable to Pay for Housing in the Last Year: No     Number of Places Lived in the Last Year: 1     Unstable Housing in the Last Year: No   [2]   Current Outpatient Medications   Medication Sig Dispense Refill    spironolactone (ALDACTONE) 100 MG tablet Take 100 mg by mouth once daily.      clotrimazole-betamethasone 1-0.05% (LOTRISONE) cream Apply topically 2 (two) times a day. 45 g 0    docusate sodium (COLACE) 100 MG capsule Take 2 capsules (200 mg total) by mouth 2 (two) times daily as needed for Constipation. (Patient not taking: Reported on 8/16/2023) 60 capsule 2    EScitalopram oxalate (LEXAPRO) 10 MG tablet Take 10 mg by mouth.      HYDROcodone-acetaminophen (NORCO) 5-325 mg per tablet Take 1 tablet by mouth every 4 (four) hours as needed for Pain. (Patient not taking: Reported on 8/16/2023) 60 tablet 0    ibuprofen (ADVIL,MOTRIN) 600 MG tablet Take 1 tablet (600 mg total) by mouth every 6 (six) hours. (Patient not taking: Reported on 8/16/2023) 60 tablet 2    ipratropium (ATROVENT) 21 mcg (0.03 %) nasal spray 2 sprays by Each Nostril route 2 (two) times daily as needed. 30 mL 0    prenatal vit calc,iron,folic (PRENATAL VITAMIN ORAL) Take by mouth.       No current facility-administered medications for this visit.

## 2025-08-05 ENCOUNTER — RESULTS FOLLOW-UP (OUTPATIENT)
Dept: OBSTETRICS AND GYNECOLOGY | Facility: CLINIC | Age: 30
End: 2025-08-05
Payer: MEDICAID

## 2025-08-10 ENCOUNTER — PATIENT MESSAGE (OUTPATIENT)
Facility: HOSPITAL | Age: 30
End: 2025-08-10
Payer: MEDICAID